# Patient Record
Sex: MALE | Race: BLACK OR AFRICAN AMERICAN | NOT HISPANIC OR LATINO | Employment: FULL TIME | URBAN - METROPOLITAN AREA
[De-identification: names, ages, dates, MRNs, and addresses within clinical notes are randomized per-mention and may not be internally consistent; named-entity substitution may affect disease eponyms.]

---

## 2017-08-02 ENCOUNTER — HOSPITAL ENCOUNTER (EMERGENCY)
Facility: HOSPITAL | Age: 32
Discharge: HOME | End: 2017-08-03
Attending: EMERGENCY MEDICINE

## 2017-08-02 DIAGNOSIS — G44.229 CHRONIC TENSION-TYPE HEADACHE, NOT INTRACTABLE: ICD-10-CM

## 2017-08-02 DIAGNOSIS — R42 DIZZINESS: ICD-10-CM

## 2017-08-02 DIAGNOSIS — Z76.0 MEDICATION REFILL: Primary | ICD-10-CM

## 2017-08-02 PROCEDURE — 93005 ELECTROCARDIOGRAM TRACING: CPT

## 2017-08-02 PROCEDURE — 99283 EMERGENCY DEPT VISIT LOW MDM: CPT

## 2017-08-03 VITALS
RESPIRATION RATE: 20 BRPM | DIASTOLIC BLOOD PRESSURE: 89 MMHG | HEIGHT: 73 IN | TEMPERATURE: 98 F | BODY MASS INDEX: 35.78 KG/M2 | SYSTOLIC BLOOD PRESSURE: 134 MMHG | HEART RATE: 54 BPM | OXYGEN SATURATION: 96 % | WEIGHT: 270 LBS

## 2017-08-03 RX ORDER — LISINOPRIL 10 MG/1
10 TABLET ORAL DAILY
Qty: 30 TABLET | Refills: 1 | Status: SHIPPED | OUTPATIENT
Start: 2017-08-03 | End: 2017-08-17 | Stop reason: SDUPTHER

## 2017-08-03 NOTE — ED PROVIDER NOTES
Encounter Date: 8/2/2017       History     Chief Complaint   Patient presents with    Dizziness     pt to triage and reports lightheaded today and occ room spinning; pt denies any injury    Headache     pt reports a headache after work today and has been getting frequent headaches for months since diagnosed with htn last year     HPI   Beth Damian is a 32 y.o. male who has a past medical history of Hypertension who presents to the Emergency Department medication refill.  Patient states he ran out today.  He has associated symptoms of headache and dizziness described as room spinning.  He states that he feels these symptoms daily and that they are relieved but was an appropriate period he does not have a blood pressure machine at home, but when he checks it at the pharmacy it is usually elevated.  Patient has had similar symptoms for the past year when he was first diagnosed with elevated blood pressure. Pt has no past surgical history on file.        Review of patient's allergies indicates:  No Known Allergies  Past Medical History:   Diagnosis Date    Hypertension      History reviewed. No pertinent surgical history.  No family history on file.  Social History   Substance Use Topics    Smoking status: Current Every Day Smoker     Packs/day: 0.50     Years: 9.00     Types: Cigarettes    Smokeless tobacco: Never Used    Alcohol use Yes     Review of Systems   Constitutional: Negative for fever.   HENT: Negative for sore throat.    Respiratory: Negative for shortness of breath.    Cardiovascular: Negative for chest pain.   Gastrointestinal: Negative for nausea.   Genitourinary: Negative for dysuria.   Musculoskeletal: Negative for back pain.   Skin: Negative for rash.   Neurological: Positive for dizziness and headaches. Negative for facial asymmetry, speech difficulty, weakness and numbness.   Hematological: Does not bruise/bleed easily.       Physical Exam     Initial Vitals [08/02/17 2154]   BP Pulse  Resp Temp SpO2   134/85 84 20 98.2 °F (36.8 °C) 96 %      MAP       101.33         Physical Exam    Nursing note and vitals reviewed.  Constitutional: He appears well-developed and well-nourished. No distress.   HENT:   Head: Normocephalic and atraumatic.   Mouth/Throat: Oropharynx is clear and moist.   Eyes: Conjunctivae are normal. Pupils are equal, round, and reactive to light.   Neck: Normal range of motion. Neck supple.   Cardiovascular: Normal rate, regular rhythm and normal heart sounds.   Pulmonary/Chest: Breath sounds normal. No respiratory distress.   Abdominal: Soft. Bowel sounds are normal. He exhibits no distension. There is no tenderness.   Musculoskeletal: Normal range of motion. He exhibits no edema or tenderness.   Lymphadenopathy:     He has no cervical adenopathy.   Neurological: He is alert and oriented to person, place, and time.   Skin: Skin is warm and dry. No rash noted. No erythema.   Psychiatric: He has a normal mood and affect. Thought content normal.         ED Course   Procedures  Labs Reviewed - No data to display          Medical Decision Making:   Initial Assessment:   This is a 32-year-old male presents for medication refill for his hypertension medication.  Patient has normal blood pressure on multiple checks.  Headache he describes as a tension headache which feels daily, sometimes worse than others.  Patient has long heavy dreadlocks and wife states that he pulls them tight.  His headache may be due to his hair.  The patient has a significant intracranial process at this time.  Recommend follow-up with PCP as an outpatient.  We will refill his lisinopril.                   ED Course     Clinical Impression:   The primary encounter diagnosis was Medication refill. Diagnoses of Dizziness and Chronic tension-type headache, not intractable were also pertinent to this visit.                           Ray Ruiz MD  08/03/17 0012

## 2017-08-17 ENCOUNTER — OFFICE VISIT (OUTPATIENT)
Dept: FAMILY MEDICINE | Facility: HOSPITAL | Age: 32
End: 2017-08-17
Attending: FAMILY MEDICINE

## 2017-08-17 VITALS
DIASTOLIC BLOOD PRESSURE: 82 MMHG | SYSTOLIC BLOOD PRESSURE: 128 MMHG | WEIGHT: 281.94 LBS | HEIGHT: 73 IN | BODY MASS INDEX: 37.37 KG/M2 | HEART RATE: 85 BPM

## 2017-08-17 DIAGNOSIS — Z76.89 ENCOUNTER TO ESTABLISH CARE: ICD-10-CM

## 2017-08-17 DIAGNOSIS — I10 ESSENTIAL HYPERTENSION: Primary | ICD-10-CM

## 2017-08-17 DIAGNOSIS — R42 DIZZINESS: ICD-10-CM

## 2017-08-17 PROCEDURE — 99213 OFFICE O/P EST LOW 20 MIN: CPT | Performed by: FAMILY MEDICINE

## 2017-08-17 RX ORDER — LISINOPRIL 10 MG/1
10 TABLET ORAL DAILY
Qty: 90 TABLET | Refills: 3 | Status: SHIPPED | OUTPATIENT
Start: 2017-08-17 | End: 2018-07-03

## 2017-08-17 NOTE — PROGRESS NOTES
Subjective:       Patient ID: Beth Damian Jr. is a 32 y.o. male.    Chief Complaint: Establish Care; Dizziness; and Headache    HPI   Mr. Damian is a 31 y/o M who presented to clinic today to establish care. He has a PMHx of HTN and a FMHx of diabetes. He has been seen in the ED twice for refills of lisinopril. He reports that for the past two weeks he has been constantly dizzy. The dizziness is worse in the morning when he can't stand without support. It is made worse by head and position changes and better by sitting. He has not had any episodes of fainting. He also complains of headaches that began shortly after his last ED visit. They occur about once per day, usually at night as he is getting ready for bed. He occasionally experiences blurry vision as well.     PSHx: None  Fx: DM  Sx: Smokes cigar x10 years, quite 3 days ago. Occasional EtOH use. 15 year hx of marijuana use, quit 1 year ago.  NKA    Review of Systems   Constitutional: Negative for activity change, diaphoresis and fatigue.   Eyes: Positive for visual disturbance (Blurry vision once every 2 days).   Respiratory: Negative for cough and shortness of breath.    Cardiovascular: Negative for chest pain.   Gastrointestinal: Negative for abdominal pain, nausea and vomiting.   Genitourinary: Negative for difficulty urinating.   Musculoskeletal: Negative for back pain and neck pain.   Neurological: Positive for dizziness. Negative for headaches.   All other systems reviewed and are negative.      Objective:      Vitals:    08/17/17 1538   BP: 128/82   Pulse: 85     Physical Exam   Constitutional: He is oriented to person, place, and time. No distress.   HENT:   Head: Normocephalic and atraumatic.   Cardiovascular: Normal rate, regular rhythm and normal heart sounds.    No murmur heard.  Pulmonary/Chest: Effort normal and breath sounds normal. No respiratory distress. He has no wheezes.   Abdominal: Soft. Bowel sounds are normal. He exhibits no  distension. There is no tenderness.   Musculoskeletal: Normal range of motion. He exhibits no edema or tenderness.   Neurological: He is alert and oriented to person, place, and time.   Skin: He is not diaphoretic.       Assessment:       1. Essential hypertension    2. Dizziness    3. Encounter to establish care        Plan:       Essential hypertension  -     lisinopril 10 MG tablet; Take 1 tablet (10 mg total) by mouth once daily.  Dispense: 90 tablet; Refill: 3    Dizziness  - Orthostatic vitals: Laying (123/80, 81), sitting (129/87, 92) and standing (124/83, 105)  - Patient reports working outdoors  - Advised patient to maintain adequate hydration    Encounter to establish care  -     CBC auto differential; Future; Expected date: 08/17/2017  -     Comprehensive metabolic panel; Future; Expected date: 08/17/2017  -     Hemoglobin A1c; Future; Expected date: 08/17/2017  -     TSH; Future; Expected date: 08/17/2017  -     Lipid panel; Future; Expected date: 08/17/2017    Return in about 1 month (around 9/17/2017), or if symptoms worsen or fail to improve.

## 2017-08-17 NOTE — PROGRESS NOTES
Subjective:       Patient ID: Beth Damian Jr. is a 32 y.o. male.    Chief Complaint: Establish Care; Dizziness; and Headache    Dizziness:    Associated symptoms: headaches.no fever, no nausea, no vomiting, no diaphoresis, no palpitations and no chest pain.  Headache     Associated symptoms include blurred vision and dizziness. Pertinent negatives include no abdominal pain, coughing, eye pain, fever, nausea, sore throat or vomiting.      Mr. Damian is a 33 y/o M who presented to clinic today to establish care. He has a PMHx of HTN and a FMHx of diabetes. He has been seen in the ED twice for refills of lisinopril. He reports that for the past two weeks he has been constantly dizzy. The dizziness is worse in the morning when he can't stand without support. It is made worse by head and position changes and better by sitting. He has not had any episodes of fainting. He also complains of headaches that began shortly after his last ED visit. They occur about once per day, usually at night as he is getting ready for bed. He occasionally experiences blurry vision as well.     Review of Systems   Constitutional: Negative for chills, diaphoresis, fatigue and fever.   HENT: Negative for congestion, ear discharge and sore throat.    Eyes: Positive for blurred vision. Negative for pain and discharge.   Respiratory: Negative for cough, hemoptysis, sputum production, shortness of breath and wheezing.    Cardiovascular: Negative for chest pain, palpitations, leg swelling and PND.   Gastrointestinal: Negative for abdominal pain, constipation, diarrhea, nausea and vomiting.   Genitourinary: Negative for dysuria.   Musculoskeletal: Negative for joint pain and myalgias.   Skin: Negative for rash.   Neurological: Positive for dizziness and headaches. Negative for loss of consciousness and syncope.   All other systems reviewed and are negative.        Review of Systems   Constitutional: Negative for chills, diaphoresis, fatigue and  fever.   HENT: Negative for congestion, ear discharge and sore throat.    Eyes: Positive for blurred vision. Negative for pain and discharge.   Respiratory: Negative for cough, hemoptysis, sputum production, shortness of breath and wheezing.    Cardiovascular: Negative for chest pain, palpitations, leg swelling and PND.   Gastrointestinal: Negative for abdominal pain, constipation, diarrhea, nausea and vomiting.   Genitourinary: Negative for dysuria.   Musculoskeletal: Negative for joint pain and myalgias.   Skin: Negative for rash.   Neurological: Positive for dizziness and headaches. Negative for loss of consciousness and syncope.   All other systems reviewed and are negative.    Objective:      Vitals:    08/17/17 1538   BP: 128/82   Pulse: 85     Physical Exam   Constitutional: He is oriented to person, place, and time. He appears well-developed and well-nourished. No distress.   HENT:   Head: Normocephalic and atraumatic.   Right Ear: Hearing, external ear and ear canal normal.   Left Ear: Hearing, tympanic membrane, external ear and ear canal normal.   Nose: Nose normal.   Mouth/Throat: Oropharynx is clear and moist.   Eyes: Conjunctivae and EOM are normal. Pupils are equal, round, and reactive to light. Right eye exhibits no discharge. Left eye exhibits no discharge. No scleral icterus.   Fundoscopic exam:       The right eye shows no papilledema.        The left eye shows no papilledema.   Neck: Normal range of motion. No thyromegaly present.   Cardiovascular: Normal rate, regular rhythm, normal heart sounds and intact distal pulses.  Exam reveals no gallop and no friction rub.    No murmur heard.  Pulmonary/Chest: Breath sounds normal. No stridor. No respiratory distress. He has no wheezes.   Abdominal: Soft. Bowel sounds are normal. He exhibits no distension. There is no tenderness.   Musculoskeletal: Normal range of motion. He exhibits no edema, tenderness or deformity.   Lymphadenopathy:     He has no  cervical adenopathy.   Neurological: He is alert and oriented to person, place, and time. No cranial nerve deficit.   Skin: Capillary refill takes less than 2 seconds. No rash noted. He is not diaphoretic.   Psychiatric: He has a normal mood and affect. His behavior is normal. Judgment and thought content normal.   Nursing note and vitals reviewed.        Assessment:       1. Essential hypertension    2. Encounter to establish care        Plan:       Essential hypertension  -     lisinopril 10 MG tablet; Take 1 tablet (10 mg total) by mouth once daily.  Dispense: 90 tablet; Refill: 3    Encounter to establish care  -     CBC auto differential; Future; Expected date: 08/17/2017  -     Comprehensive metabolic panel; Future; Expected date: 08/17/2017  -     Hemoglobin A1c; Future; Expected date: 08/17/2017  -     TSH; Future; Expected date: 08/17/2017  -     Lipid panel; Future; Expected date: 08/17/2017      No Follow-up on file.

## 2017-08-18 DIAGNOSIS — E78.2 MIXED HYPERLIPIDEMIA: Primary | ICD-10-CM

## 2017-08-18 DIAGNOSIS — R73.03 PREDIABETES: ICD-10-CM

## 2017-08-18 RX ORDER — ATORVASTATIN CALCIUM 40 MG/1
40 TABLET, FILM COATED ORAL DAILY
Qty: 90 TABLET | Refills: 3 | Status: SHIPPED | OUTPATIENT
Start: 2017-08-18 | End: 2018-07-20 | Stop reason: SDUPTHER

## 2017-08-18 RX ORDER — METFORMIN HYDROCHLORIDE 500 MG/1
500 TABLET ORAL 2 TIMES DAILY WITH MEALS
Qty: 60 TABLET | Refills: 0 | Status: SHIPPED | OUTPATIENT
Start: 2017-08-18 | End: 2017-09-19

## 2017-08-18 NOTE — PROGRESS NOTES
Called patient in order to discuss lab results. Unable to speak with patient, left a voicemail to call the clinic for more details. Will start Metformin and Lipitor for Prediabetes and HLD, respectively.    Katharine Garcia MD  [facility]  3  08/18/2017 9:42 AM

## 2017-09-19 ENCOUNTER — OFFICE VISIT (OUTPATIENT)
Dept: FAMILY MEDICINE | Facility: HOSPITAL | Age: 32
End: 2017-09-19
Attending: FAMILY MEDICINE

## 2017-09-19 VITALS
DIASTOLIC BLOOD PRESSURE: 77 MMHG | WEIGHT: 284 LBS | HEIGHT: 73 IN | SYSTOLIC BLOOD PRESSURE: 127 MMHG | HEART RATE: 83 BPM | BODY MASS INDEX: 37.64 KG/M2

## 2017-09-19 DIAGNOSIS — R73.03 PREDIABETES: ICD-10-CM

## 2017-09-19 DIAGNOSIS — I10 ESSENTIAL HYPERTENSION: Primary | ICD-10-CM

## 2017-09-19 PROCEDURE — 99213 OFFICE O/P EST LOW 20 MIN: CPT | Performed by: FAMILY MEDICINE

## 2017-09-19 RX ORDER — METFORMIN HYDROCHLORIDE 1000 MG/1
1000 TABLET ORAL 2 TIMES DAILY WITH MEALS
Qty: 180 TABLET | Refills: 3 | Status: SHIPPED | OUTPATIENT
Start: 2017-09-19 | End: 2018-07-03

## 2017-09-19 NOTE — PROGRESS NOTES
Subjective:       Patient ID: Beth Damian Jr. is a 32 y.o. male.    Chief Complaint: Follow-up    HPI   Mr. Damian is a 33 yo male recently diagnosed with HTN and prediabetes presenting for follow up visit. Patient reports feeling well and denies any complains. He reports compliance with his lisinopril and metformin medications. Patient states that he no longer has the HA and dizziness since starting lisinopril. He does not monitor his BP at home. Patient denies any adverse reactions to lisinopril and metformin. He denies any F/C, HA, SOB or chest pain.    Review of Systems   Constitutional: Negative for chills and fever.   HENT: Negative for congestion.    Respiratory: Negative for cough, shortness of breath and wheezing.    Cardiovascular: Negative for chest pain.   Gastrointestinal: Negative for abdominal pain, nausea and vomiting.   Genitourinary: Negative for difficulty urinating.   Musculoskeletal: Negative for back pain and neck pain.   Neurological: Negative for dizziness and headaches.       Objective:      Vitals:    09/19/17 1155   BP: 127/77   Pulse: 83     Physical Exam   Constitutional: He is oriented to person, place, and time. He appears well-nourished. No distress.   HENT:   Head: Normocephalic and atraumatic.   Cardiovascular: Normal rate, regular rhythm and normal heart sounds.    No murmur heard.  Pulmonary/Chest: Effort normal and breath sounds normal. No respiratory distress. He has no wheezes.   Abdominal: Soft. Bowel sounds are normal. He exhibits no distension. There is no tenderness.   Musculoskeletal: Normal range of motion. He exhibits no edema or tenderness.   Neurological: He is alert and oriented to person, place, and time.   Skin: He is not diaphoretic.       Assessment:       1. Essential hypertension    2. Prediabetes    3. BMI 37.0-37.9, adult        Plan:       Essential hypertension  - Advised patient to follow a low salt/low fat diet and monitor his home BP  - Continue  Lisinopril 10mg daily    Prediabetes  - Counseled patient on diet and exercise  -     metformin (GLUCOPHAGE) 1000 MG tablet; Take 1 tablet (1,000 mg total) by mouth 2 (two) times daily with meals.  Dispense: 180 tablet; Refill: 3    BMI 37.0-37.9, adult  - Counseled patient on diet and exercise    Return in about 2 months (around 11/19/2017), or if symptoms worsen or fail to improve.

## 2017-12-22 ENCOUNTER — HOSPITAL ENCOUNTER (EMERGENCY)
Facility: HOSPITAL | Age: 32
Discharge: HOME | End: 2017-12-22
Attending: EMERGENCY MEDICINE

## 2017-12-22 VITALS
RESPIRATION RATE: 18 BRPM | HEART RATE: 98 BPM | HEIGHT: 73 IN | WEIGHT: 275 LBS | TEMPERATURE: 101 F | DIASTOLIC BLOOD PRESSURE: 81 MMHG | SYSTOLIC BLOOD PRESSURE: 141 MMHG | OXYGEN SATURATION: 96 % | BODY MASS INDEX: 36.45 KG/M2

## 2017-12-22 DIAGNOSIS — R07.81 PLEURITIC CHEST PAIN: ICD-10-CM

## 2017-12-22 DIAGNOSIS — J40 BRONCHITIS: Primary | ICD-10-CM

## 2017-12-22 DIAGNOSIS — R07.9 CHEST PAIN: ICD-10-CM

## 2017-12-22 LAB
ALBUMIN SERPL BCP-MCNC: 3.9 G/DL
ALP SERPL-CCNC: 85 U/L
ALT SERPL W/O P-5'-P-CCNC: 28 U/L
ANION GAP SERPL CALC-SCNC: 12 MMOL/L
AST SERPL-CCNC: 25 U/L
BASOPHILS # BLD AUTO: 0.01 K/UL
BASOPHILS NFR BLD: 0.1 %
BILIRUB SERPL-MCNC: 0.5 MG/DL
BNP SERPL-MCNC: <10 PG/ML
BUN SERPL-MCNC: 16 MG/DL
CALCIUM SERPL-MCNC: 9 MG/DL
CHLORIDE SERPL-SCNC: 104 MMOL/L
CO2 SERPL-SCNC: 22 MMOL/L
CREAT SERPL-MCNC: 1.4 MG/DL
D DIMER PPP IA.FEU-MCNC: 0.24 MG/L FEU
DIFFERENTIAL METHOD: ABNORMAL
EOSINOPHIL # BLD AUTO: 0.1 K/UL
EOSINOPHIL NFR BLD: 0.8 %
ERYTHROCYTE [DISTWIDTH] IN BLOOD BY AUTOMATED COUNT: 13.8 %
EST. GFR  (AFRICAN AMERICAN): >60 ML/MIN/1.73 M^2
EST. GFR  (NON AFRICAN AMERICAN): >60 ML/MIN/1.73 M^2
FLUAV AG SPEC QL IA: NEGATIVE
FLUBV AG SPEC QL IA: NEGATIVE
GLUCOSE SERPL-MCNC: 93 MG/DL
HCT VFR BLD AUTO: 42.3 %
HGB BLD-MCNC: 13.9 G/DL
LYMPHOCYTES # BLD AUTO: 1.2 K/UL
LYMPHOCYTES NFR BLD: 14.6 %
MCH RBC QN AUTO: 28.4 PG
MCHC RBC AUTO-ENTMCNC: 32.9 G/DL
MCV RBC AUTO: 87 FL
MONOCYTES # BLD AUTO: 0.7 K/UL
MONOCYTES NFR BLD: 7.7 %
NEUTROPHILS # BLD AUTO: 6.4 K/UL
NEUTROPHILS NFR BLD: 76.6 %
PLATELET # BLD AUTO: 236 K/UL
PMV BLD AUTO: 9.3 FL
POCT GLUCOSE: 85 MG/DL (ref 70–110)
POTASSIUM SERPL-SCNC: 3.7 MMOL/L
PROT SERPL-MCNC: 7.6 G/DL
RBC # BLD AUTO: 4.89 M/UL
SODIUM SERPL-SCNC: 138 MMOL/L
SPECIMEN SOURCE: NORMAL
TROPONIN I SERPL DL<=0.01 NG/ML-MCNC: 0.01 NG/ML
TROPONIN I SERPL DL<=0.01 NG/ML-MCNC: <0.006 NG/ML
WBC # BLD AUTO: 8.4 K/UL

## 2017-12-22 PROCEDURE — 80053 COMPREHEN METABOLIC PANEL: CPT

## 2017-12-22 PROCEDURE — 99284 EMERGENCY DEPT VISIT MOD MDM: CPT

## 2017-12-22 PROCEDURE — 93005 ELECTROCARDIOGRAM TRACING: CPT

## 2017-12-22 PROCEDURE — 63600175 PHARM REV CODE 636 W HCPCS: Performed by: EMERGENCY MEDICINE

## 2017-12-22 PROCEDURE — 85379 FIBRIN DEGRADATION QUANT: CPT

## 2017-12-22 PROCEDURE — 84484 ASSAY OF TROPONIN QUANT: CPT | Mod: 91

## 2017-12-22 PROCEDURE — 82962 GLUCOSE BLOOD TEST: CPT

## 2017-12-22 PROCEDURE — 85025 COMPLETE CBC W/AUTO DIFF WBC: CPT

## 2017-12-22 PROCEDURE — 87400 INFLUENZA A/B EACH AG IA: CPT

## 2017-12-22 PROCEDURE — 87040 BLOOD CULTURE FOR BACTERIA: CPT

## 2017-12-22 PROCEDURE — 96365 THER/PROPH/DIAG IV INF INIT: CPT

## 2017-12-22 PROCEDURE — 25000003 PHARM REV CODE 250: Performed by: EMERGENCY MEDICINE

## 2017-12-22 PROCEDURE — 83880 ASSAY OF NATRIURETIC PEPTIDE: CPT

## 2017-12-22 RX ORDER — NITROGLYCERIN 0.4 MG/1
0.4 TABLET SUBLINGUAL EVERY 5 MIN PRN
Status: DISCONTINUED | OUTPATIENT
Start: 2017-12-22 | End: 2017-12-22 | Stop reason: HOSPADM

## 2017-12-22 RX ORDER — OSELTAMIVIR PHOSPHATE 75 MG/1
75 CAPSULE ORAL 2 TIMES DAILY
Qty: 10 CAPSULE | Refills: 0 | Status: SHIPPED | OUTPATIENT
Start: 2017-12-22 | End: 2017-12-27

## 2017-12-22 RX ORDER — ACETAMINOPHEN 500 MG
1000 TABLET ORAL
Status: COMPLETED | OUTPATIENT
Start: 2017-12-22 | End: 2017-12-22

## 2017-12-22 RX ORDER — ASPIRIN 325 MG
325 TABLET ORAL
Status: COMPLETED | OUTPATIENT
Start: 2017-12-22 | End: 2017-12-22

## 2017-12-22 RX ORDER — OSELTAMIVIR PHOSPHATE 75 MG/1
75 CAPSULE ORAL
Status: COMPLETED | OUTPATIENT
Start: 2017-12-22 | End: 2017-12-22

## 2017-12-22 RX ORDER — MOXIFLOXACIN HYDROCHLORIDE 400 MG/1
400 TABLET ORAL DAILY
Qty: 14 TABLET | Refills: 0 | Status: SHIPPED | OUTPATIENT
Start: 2017-12-22 | End: 2018-03-23

## 2017-12-22 RX ADMIN — MOXIFLOXACIN HYDROCHLORIDE 400 MG: 400 INJECTION, SOLUTION INTRAVENOUS at 04:12

## 2017-12-22 RX ADMIN — ACETAMINOPHEN 1000 MG: 500 TABLET ORAL at 03:12

## 2017-12-22 RX ADMIN — OSELTAMIVIR PHOSPHATE 75 MG: 75 CAPSULE ORAL at 04:12

## 2017-12-22 RX ADMIN — ASPIRIN 325 MG ORAL TABLET 325 MG: 325 PILL ORAL at 02:12

## 2017-12-22 NOTE — ED NOTES
Patient identifiers for Beth Damian Jr. checked and correct.  LOC: The patient is awake, alert and aware of environment with an appropriate affect, the patient is oriented x 3 and speaking appropriately.  APPEARANCE: Patient uncomfortable and in no acute distress, patient is clean and well groomed, patient's clothing are properly fastened.  SKIN: The skin is warm and dry, patient has normal skin turgor and moist mucus membranes.  MUSKULOSKELETAL: Patient moving all extremities well, no obvious swelling or deformities noted.  RESPIRATORY: Airway is open and patent, respirations are spontaneous, patient has a normal effort and rate.  NEUROLOGIC: PERRL, facial expression is symmetrical, bilateral hand grasp equal and even, normal sensation in all extremities when touched with a finger.

## 2017-12-22 NOTE — ED PROVIDER NOTES
"Encounter Date: 12/22/2017    SCRIBE #1 NOTE: I, Alton Willis, am scribing for, and in the presence of,  Tracie Vale MD. I have scribed the entire note.       History     Chief Complaint   Patient presents with    Headache     reports headache that started today and cough that made the pt "black out for a second". Family reports episode lasted "a minute". Pt reports blurred vision since yesterday.        Time patient was seen by the provider: 2:04 AM      The patient is a 32 y.o. male with hx of: HTN  that presents to the ED with a complaint of Headache, fever, cough. The patient reports he has been ill with the symptoms above for the past few days. He had a particularly bad fit of coughing today. The patient states as he was coughing his vision went black and he awoke a second or two afterward. He reports pain in his chest wall with cough but no shortness of breath, nausea/vomiting, or headache.          Review of patient's allergies indicates:  No Known Allergies  Past Medical History:   Diagnosis Date    Hypertension      No past surgical history on file.  Family History   Problem Relation Age of Onset    Hypertension Mother     Hypertension Father     Diabetes Brother     Diabetes Daughter     Diabetes Maternal Grandmother     Diabetes Paternal Grandfather      Social History   Substance Use Topics    Smoking status: Current Every Day Smoker     Packs/day: 0.50     Years: 9.00     Types: Cigarettes    Smokeless tobacco: Never Used    Alcohol use Yes     Review of Systems   Constitutional: Negative for fever.   HENT: Negative for sore throat.    Respiratory: Positive for cough. Negative for shortness of breath.    Cardiovascular: Positive for chest pain.   Gastrointestinal: Negative for nausea.   Genitourinary: Negative for dysuria.   Musculoskeletal: Negative for back pain.   Skin: Negative for rash.   Neurological: Positive for syncope. Negative for weakness.   Hematological: Does not bruise/bleed " easily.       Physical Exam     Initial Vitals [12/22/17 0034]   BP Pulse Resp Temp SpO2   (!) 172/87 100 20 98.5 °F (36.9 °C) 98 %      MAP       115.33         Physical Exam    Nursing note and vitals reviewed.  Constitutional: He appears well-developed and well-nourished.   HENT:   Head: Normocephalic and atraumatic.   Eyes: Conjunctivae are normal.   Neck: Neck supple.   Cardiovascular: Normal rate, regular rhythm, normal heart sounds and intact distal pulses. Exam reveals no gallop and no friction rub.    No murmur heard.  Pulmonary/Chest: Breath sounds normal. He has no wheezes. He has no rhonchi. He has no rales.   Abdominal: Soft. He exhibits no distension. There is no tenderness.   Musculoskeletal: Normal range of motion.   Neurological: He is alert and oriented to person, place, and time.   Skin: No rash noted. No erythema.   Psychiatric: He has a normal mood and affect.         ED Course   Procedures  Labs Reviewed   CBC W/ AUTO DIFFERENTIAL - Abnormal; Notable for the following:        Result Value    Hemoglobin 13.9 (*)     Gran% 76.6 (*)     Lymph% 14.6 (*)     All other components within normal limits   COMPREHENSIVE METABOLIC PANEL - Abnormal; Notable for the following:     CO2 22 (*)     All other components within normal limits   CULTURE, BLOOD   CULTURE, BLOOD   INFLUENZA A AND B ANTIGEN   TROPONIN I   TROPONIN I   B-TYPE NATRIURETIC PEPTIDE   D DIMER, QUANTITATIVE   POCT GLUCOSE     Imaging Results          X-Ray Chest PA And Lateral (Final result)  Result time 12/22/17 02:45:16    Final result by Robin Castellanos MD (12/22/17 02:45:16)                 Impression:      No acute cardiopulmonary process identified.      Electronically signed by: ROBIN CASTELLANOS MD  Date:     12/22/17  Time:    02:45              Narrative:    Chest PA and lateral.  Comparison: None.    Cardiac silhouette is normal in size.  Mediastinal structures are midline.  Lungs are symmetrically expanded.  No evidence of focal  consolidative process, pneumothorax, or significant effusion.  Bones appear intact.                              EKG Readings: (Independently Interpreted)   Initial Reading: No STEMI. Rhythm: Normal Sinus Rhythm. Heart Rate: 92.          Medical Decision Making:   Initial Assessment:   31 Y/O male presents to the ED with an complaint of syncope following a coughing fit. He will be evaluated for cardiogenic and systemic causes of his syncopal episode.   Differential Diagnosis:   Vasovagal epiosde              Attending Attestation:           Physician Attestation for Scribe:      Comments: I, Dr. Vale, personally performed the services described in this documentation. All medical record entries made by the scribe were at my direction and in my presence.  I have reviewed the chart and agree that the record reflects my personal performance and is accurate and complete.              ED Course      Clinical Impression:   The primary encounter diagnosis was Bronchitis. Diagnoses of Chest pain and Pleuritic chest pain were also pertinent to this visit.    Disposition:   Disposition: Discharged  Condition: Stable                        Aidee Vale MD  12/22/17 1921

## 2017-12-22 NOTE — ED NOTES
Eyes closed.  Awakes with verbal stimuli.  Respirations even and unlabored. Family member at bedside.

## 2017-12-22 NOTE — ED NOTES
Complaining of cough and headache since yesterday.  States he was coughing so much he blacked out for a second.

## 2017-12-27 LAB
BACTERIA BLD CULT: NORMAL
BACTERIA BLD CULT: NORMAL

## 2018-03-23 ENCOUNTER — HOSPITAL ENCOUNTER (EMERGENCY)
Facility: HOSPITAL | Age: 33
Discharge: HOME | End: 2018-03-23
Attending: EMERGENCY MEDICINE

## 2018-03-23 VITALS
BODY MASS INDEX: 34.46 KG/M2 | WEIGHT: 260 LBS | DIASTOLIC BLOOD PRESSURE: 78 MMHG | RESPIRATION RATE: 16 BRPM | OXYGEN SATURATION: 97 % | HEART RATE: 86 BPM | HEIGHT: 73 IN | TEMPERATURE: 99 F | SYSTOLIC BLOOD PRESSURE: 135 MMHG

## 2018-03-23 DIAGNOSIS — B34.9 VIRAL SYNDROME: Primary | ICD-10-CM

## 2018-03-23 LAB
DEPRECATED S PYO AG THROAT QL EIA: NEGATIVE
FLUAV AG SPEC QL IA: NEGATIVE
FLUBV AG SPEC QL IA: NEGATIVE
SPECIMEN SOURCE: NORMAL

## 2018-03-23 PROCEDURE — 99283 EMERGENCY DEPT VISIT LOW MDM: CPT

## 2018-03-23 PROCEDURE — 87400 INFLUENZA A/B EACH AG IA: CPT | Mod: 59

## 2018-03-23 PROCEDURE — 87081 CULTURE SCREEN ONLY: CPT

## 2018-03-23 PROCEDURE — 25000003 PHARM REV CODE 250: Performed by: PHYSICIAN ASSISTANT

## 2018-03-23 PROCEDURE — 87880 STREP A ASSAY W/OPTIC: CPT

## 2018-03-23 RX ORDER — IBUPROFEN 400 MG/1
800 TABLET ORAL
Status: COMPLETED | OUTPATIENT
Start: 2018-03-23 | End: 2018-03-23

## 2018-03-23 RX ADMIN — IBUPROFEN 800 MG: 400 TABLET, FILM COATED ORAL at 01:03

## 2018-03-23 NOTE — DISCHARGE INSTRUCTIONS
Increase fluids and take tylenol, motrin as needed for fever, body aches and headaches.  Follow up with primary care physician for further evaluation.

## 2018-03-23 NOTE — ED PROVIDER NOTES
Encounter Date: 3/23/2018       History     Chief Complaint   Patient presents with    Chills     Pt states he is running fever and having chills only at night x 2 days. States he started to have a sore throat yesterday. Also complains of a headache.      Beth Damian Jr., a 33 y.o. male that presents to the ED for intermittent subjective fever, sore throat and headache that started 2 days ago.  Headache is frontal in nature and waxes and wanes in intensity.  Similar to previous headaches.  Sore throat is worse before bedtime.  Denies any sick contacts.  Treatments tried include administration of Dayquil yesterday with little improvement of his symptoms.          The history is provided by the patient.     Review of patient's allergies indicates:  No Known Allergies  Past Medical History:   Diagnosis Date    Diabetes mellitus     Hypercholesteremia     Hypertension      History reviewed. No pertinent surgical history.  Family History   Problem Relation Age of Onset    Hypertension Mother     Hypertension Father     Diabetes Brother     Diabetes Daughter     Diabetes Maternal Grandmother     Diabetes Paternal Grandfather      Social History   Substance Use Topics    Smoking status: Current Every Day Smoker     Packs/day: 0.50     Years: 9.00     Types: Cigarettes    Smokeless tobacco: Never Used      Comment: cigars    Alcohol use Yes      Comment: occasional     Review of Systems   Constitutional: Positive for fever (subjective).   HENT: Positive for sore throat. Negative for congestion, trouble swallowing and voice change.    Respiratory: Negative for cough and shortness of breath.    Cardiovascular: Negative for chest pain.   Gastrointestinal: Negative for diarrhea and vomiting.   Skin: Negative for color change and rash.   Allergic/Immunologic: Negative for immunocompromised state.   Neurological: Positive for headaches. Negative for dizziness, speech difficulty, weakness, light-headedness and  numbness.   Psychiatric/Behavioral: Negative for agitation and confusion.   All other systems reviewed and are negative.      Physical Exam     Initial Vitals [03/23/18 1327]   BP Pulse Resp Temp SpO2   (!) 142/87 87 18 98.4 °F (36.9 °C) 98 %      MAP       105.33         Physical Exam    Nursing note and vitals reviewed.  Constitutional: He appears well-developed and well-nourished.   HENT:   Head: Normocephalic and atraumatic.   Right Ear: Hearing, tympanic membrane, external ear and ear canal normal.   Left Ear: Hearing, tympanic membrane, external ear and ear canal normal.   Nose: Nose normal.   Mouth/Throat: Oropharynx is clear and moist and mucous membranes are normal.   Eyes: EOM are normal.   Neck: Normal range of motion. Neck supple.   Cardiovascular: Normal rate and regular rhythm.   Pulmonary/Chest: Breath sounds normal. No stridor. No respiratory distress. He has no wheezes. He has no rhonchi. He has no rales.   Abdominal: Soft. Bowel sounds are normal. He exhibits no distension. There is no tenderness. There is no rebound and no guarding.   Musculoskeletal: Normal range of motion. He exhibits no edema or tenderness.   Lymphadenopathy:     He has no cervical adenopathy.   Neurological: He is alert and oriented to person, place, and time. No cranial nerve deficit.   Skin: Skin is warm and dry. No rash and no abscess noted. No erythema.   Psychiatric: He has a normal mood and affect. Thought content normal.         ED Course   Procedures  Labs Reviewed   THROAT SCREEN, RAPID   CULTURE, STREP A,  THROAT   INFLUENZA A AND B ANTIGEN             Medical Decision Making:   Initial Assessment:   Headache, subjective fever, sore throat  Differential Diagnosis:   Influenza, Strep, viral syndrome  ED Management:  Patient presents to ED in NAD, afebrile and non-toxic.  Lungs are CTAB.  No oopharyngeal erythema or edema.  Motrin given with improvement of headache which I suspect is benign.  Patient neurologically  "intact.   No neck stiffness, vision changes, fever, rash, meningismus/neck stiffness to suggest pseudotumor cerebri or meningitis.  No pain over temporal arteries or vision changes/loss to suggest temporal arteritis.  No "thunderclap onset" or neck stiffness to suggest spontaneous SAH/ICH.  No lancinated pain to eyes with tearing to suggest cluster headache.  No sinus pressure or nasal congestion to suggest sinus headache.  Patient's headache is not in a "band like" distrubution to suggest tension headache.  Influenza and strep negative.  Patient likely suffering from viral syndrome.  He was instructed to f/u with PCP for further evaluation.                Attending Attestation:     Physician Attestation Statement for NP/PA:   I discussed this assessment and plan of this patient with the NP/PA, but I did not personally examine the patient. The face to face encounter was performed by the NP/PA.                     Clinical Impression:   The encounter diagnosis was Viral syndrome.                           Hannah Bernabe PA-C  03/23/18 1507       Daniel Murry MD  03/23/18 9968    "

## 2018-03-23 NOTE — ED NOTES
Pt c/o fever and chills (only at night) x 2 nights. Sorethroat and non productive cough at night also reports ha and chest pain. Medical hx htn and dm.

## 2018-03-25 LAB — BACTERIA THROAT CULT: NORMAL

## 2018-07-03 ENCOUNTER — HOSPITAL ENCOUNTER (EMERGENCY)
Facility: HOSPITAL | Age: 33
Discharge: HOME | End: 2018-07-03
Attending: EMERGENCY MEDICINE
Payer: MEDICAID

## 2018-07-03 VITALS
TEMPERATURE: 99 F | HEIGHT: 73 IN | WEIGHT: 275 LBS | HEART RATE: 82 BPM | RESPIRATION RATE: 16 BRPM | DIASTOLIC BLOOD PRESSURE: 92 MMHG | SYSTOLIC BLOOD PRESSURE: 146 MMHG | OXYGEN SATURATION: 96 % | BODY MASS INDEX: 36.45 KG/M2

## 2018-07-03 DIAGNOSIS — I10 HYPERTENSION, UNSPECIFIED TYPE: ICD-10-CM

## 2018-07-03 DIAGNOSIS — I10 ESSENTIAL HYPERTENSION: ICD-10-CM

## 2018-07-03 DIAGNOSIS — R42 DIZZINESS: Primary | ICD-10-CM

## 2018-07-03 LAB
ANION GAP SERPL CALC-SCNC: 10 MMOL/L
BASOPHILS # BLD AUTO: 0.01 K/UL
BASOPHILS NFR BLD: 0.1 %
BILIRUB UR QL STRIP: NEGATIVE
BUN SERPL-MCNC: 11 MG/DL
CALCIUM SERPL-MCNC: 9.9 MG/DL
CHLORIDE SERPL-SCNC: 105 MMOL/L
CLARITY UR: CLEAR
CO2 SERPL-SCNC: 24 MMOL/L
COLOR UR: YELLOW
CREAT SERPL-MCNC: 1.2 MG/DL
DIFFERENTIAL METHOD: ABNORMAL
EOSINOPHIL # BLD AUTO: 0 K/UL
EOSINOPHIL NFR BLD: 0.4 %
ERYTHROCYTE [DISTWIDTH] IN BLOOD BY AUTOMATED COUNT: 14.3 %
EST. GFR  (AFRICAN AMERICAN): >60 ML/MIN/1.73 M^2
EST. GFR  (NON AFRICAN AMERICAN): >60 ML/MIN/1.73 M^2
GLUCOSE SERPL-MCNC: 74 MG/DL (ref 70–110)
GLUCOSE SERPL-MCNC: 92 MG/DL
GLUCOSE UR QL STRIP: NEGATIVE
HCT VFR BLD AUTO: 43.9 %
HGB BLD-MCNC: 14.5 G/DL
HGB UR QL STRIP: NEGATIVE
KETONES UR QL STRIP: NEGATIVE
LEUKOCYTE ESTERASE UR QL STRIP: NEGATIVE
LYMPHOCYTES # BLD AUTO: 2.9 K/UL
LYMPHOCYTES NFR BLD: 30.3 %
MCH RBC QN AUTO: 28 PG
MCHC RBC AUTO-ENTMCNC: 33 G/DL
MCV RBC AUTO: 85 FL
MONOCYTES # BLD AUTO: 0.3 K/UL
MONOCYTES NFR BLD: 3.5 %
NEUTROPHILS # BLD AUTO: 6.2 K/UL
NEUTROPHILS NFR BLD: 65.5 %
NITRITE UR QL STRIP: NEGATIVE
PH UR STRIP: 6 [PH] (ref 5–8)
PLATELET # BLD AUTO: 259 K/UL
PMV BLD AUTO: 9.5 FL
POCT GLUCOSE: 105 MG/DL (ref 70–110)
POTASSIUM SERPL-SCNC: 4.1 MMOL/L
PROT UR QL STRIP: NEGATIVE
RBC # BLD AUTO: 5.18 M/UL
SODIUM SERPL-SCNC: 139 MMOL/L
SP GR UR STRIP: 1.02 (ref 1–1.03)
URN SPEC COLLECT METH UR: NORMAL
UROBILINOGEN UR STRIP-ACNC: 1 EU/DL
WBC # BLD AUTO: 9.51 K/UL

## 2018-07-03 PROCEDURE — 80048 BASIC METABOLIC PNL TOTAL CA: CPT

## 2018-07-03 PROCEDURE — 93010 ELECTROCARDIOGRAM REPORT: CPT | Mod: ,,, | Performed by: INTERNAL MEDICINE

## 2018-07-03 PROCEDURE — 82962 GLUCOSE BLOOD TEST: CPT

## 2018-07-03 PROCEDURE — 25000003 PHARM REV CODE 250: Performed by: EMERGENCY MEDICINE

## 2018-07-03 PROCEDURE — 81003 URINALYSIS AUTO W/O SCOPE: CPT

## 2018-07-03 PROCEDURE — 85025 COMPLETE CBC W/AUTO DIFF WBC: CPT

## 2018-07-03 PROCEDURE — 93005 ELECTROCARDIOGRAM TRACING: CPT

## 2018-07-03 PROCEDURE — 93010 EKG 12-LEAD: ICD-10-PCS | Mod: ,,, | Performed by: INTERNAL MEDICINE

## 2018-07-03 PROCEDURE — 99284 EMERGENCY DEPT VISIT MOD MDM: CPT | Mod: 25

## 2018-07-03 RX ORDER — SODIUM CHLORIDE 9 MG/ML
1000 INJECTION, SOLUTION INTRAVENOUS
Status: DISCONTINUED | OUTPATIENT
Start: 2018-07-03 | End: 2018-07-03 | Stop reason: HOSPADM

## 2018-07-03 RX ORDER — METFORMIN HYDROCHLORIDE 500 MG/1
500 TABLET ORAL
Qty: 30 TABLET | Refills: 0 | Status: SHIPPED | OUTPATIENT
Start: 2018-07-03 | End: 2019-07-03

## 2018-07-03 RX ORDER — LISINOPRIL 10 MG/1
10 TABLET ORAL 2 TIMES DAILY
Qty: 60 TABLET | Refills: 3 | Status: SHIPPED | OUTPATIENT
Start: 2018-07-03 | End: 2018-07-19 | Stop reason: ALTCHOICE

## 2018-07-03 RX ORDER — LISINOPRIL 10 MG/1
10 TABLET ORAL 2 TIMES DAILY
Qty: 60 TABLET | Refills: 3 | Status: SHIPPED | OUTPATIENT
Start: 2018-07-03 | End: 2018-07-03

## 2018-07-03 RX ORDER — METFORMIN HYDROCHLORIDE 500 MG/1
500 TABLET ORAL
Qty: 60 TABLET | Refills: 0 | Status: SHIPPED | OUTPATIENT
Start: 2018-07-03 | End: 2021-01-21 | Stop reason: SDUPTHER

## 2018-07-03 NOTE — ED PROVIDER NOTES
Encounter Date: 7/3/2018    SCRIBE #1 NOTE: I, Beverly Charanjit, am scribing for, and in the presence of,  Dr. Crowder. I have scribed the entire note.       History     Chief Complaint   Patient presents with    Dizziness     dizziness that started three days ago. Denies chest pain, and/or shortness of breath.  Has not been taking Metformin because it cause him to have loose stools.     Time seen by provider: 5:55 PM    This is a 33 y.o. male with PMHx of HTN, DM, hypercholesteremia who presents to the ED with a cc of lightheadedness x 3 days. The lightheadedness is exacerbated by standing up, eating chicken and salty food. Pt reports no alleviating factors. Pt denies fever, chest pain, urinary complaints, diarrhea or SOB. Pt reports a prior history of similar symptoms when he had previously been on Lisinopril 10mg once a day; he had been taking it BID for awhile without symptoms, but had a recent medication change back to once a day. Pt also stopped taking his Metformin because it caused diarrhea. He is a smoker. PCP is Dr. Garcia.   Pt denies any hx of young CAD, or CVA's        The history is provided by the patient.     Review of patient's allergies indicates:  No Known Allergies  Past Medical History:   Diagnosis Date    Diabetes mellitus     Hypercholesteremia     Hypertension      History reviewed. No pertinent surgical history.  Family History   Problem Relation Age of Onset    Hypertension Mother     Hypertension Father     Diabetes Brother     Diabetes Daughter     Diabetes Maternal Grandmother     Diabetes Paternal Grandfather      Social History   Substance Use Topics    Smoking status: Current Every Day Smoker     Packs/day: 0.50     Years: 9.00     Types: Cigarettes    Smokeless tobacco: Never Used      Comment: cigars    Alcohol use Yes      Comment: occasional     Review of Systems   Constitutional: Negative.  Negative for fever.   HENT: Negative.  Negative for sore throat.    Eyes:  Negative.    Respiratory: Negative.  Negative for cough and shortness of breath.    Cardiovascular: Negative.  Negative for chest pain.   Gastrointestinal: Negative.  Negative for abdominal pain, diarrhea, nausea and vomiting.   Endocrine: Negative.    Genitourinary: Negative.  Negative for dysuria, flank pain, frequency, hematuria and urgency.        Negative for urinary complaints.   Musculoskeletal: Negative.  Negative for back pain.   Skin: Negative.  Negative for rash.   Allergic/Immunologic: Negative.    Neurological: Positive for light-headedness. Negative for weakness.   Hematological: Does not bruise/bleed easily.   Psychiatric/Behavioral: Negative.  Negative for confusion and hallucinations.   All other systems reviewed and are negative.      Physical Exam     Initial Vitals [07/03/18 1723]   BP Pulse Resp Temp SpO2   (!) 153/79 82 16 98.6 °F (37 °C) 98 %      MAP       --         Physical Exam    Nursing note and vitals reviewed.  Constitutional: He appears well-developed and well-nourished.   Well-appearing, MMM   HENT:   Head: Normocephalic and atraumatic.   Eyes: Conjunctivae and EOM are normal. Pupils are equal, round, and reactive to light.   Neck: Normal range of motion. Neck supple.   Cardiovascular: Normal rate, regular rhythm, normal heart sounds and intact distal pulses.   Pulmonary/Chest: Breath sounds normal.   Abdominal: Soft. Bowel sounds are normal.   Musculoskeletal: Normal range of motion.   Neurological: He is alert and oriented to person, place, and time. He has normal strength and normal reflexes.   Normal finger-nose, normal gait   Skin: Skin is warm. Capillary refill takes less than 2 seconds.   Psychiatric: He has a normal mood and affect. His behavior is normal. Judgment and thought content normal.         ED Course   Procedures  Labs Reviewed   POCT GLUCOSE   POCT GLUCOSE MONITORING CONTINUOUS     EKG Readings: (Independently Interpreted)   Rhythm: Normal Sinus Rhythm. Heart Rate:  74. Ectopy: No Ectopy. Conduction: Normal. ST Segments: Normal ST Segments. T Waves: Normal. Axis: Normal. Clinical Impression: Normal Sinus Rhythm       Imaging Results    None       X-Rays:   Independently Interpreted Readings:   Other Readings:  Narrative     EXAMINATION:  CT HEAD WITHOUT CONTRAST    CLINICAL HISTORY:  Dizziness;    TECHNIQUE:  Low dose axial CT images obtained throughout the head without intravenous contrast. Sagittal and coronal reconstructions were performed.    COMPARISON:  None.    FINDINGS:  Intracranial compartment:    Ventricles and sulci are normal in size for age without evidence of hydrocephalus. No extra-axial blood or fluid collections.    The brain parenchyma appears normal. No parenchymal mass, hemorrhage, edema or major vascular distribution infarct.    Skull/extracranial contents (limited evaluation): No fracture. Mastoid air cells and paranasal sinuses are essentially clear.  Visualized portions of the orbits are within normal limits.  Impression       Normal noncontrast head CT.        Medical Decision Making:   Initial Assessment:   This is a 33 y.o. male with PMHx of HTN, DM, hypercholesteremia who presents to the ED with a cc of lightheadedness x 3 days. The lightheadedness is exacerbated by standing up, eating chicken and salty food. Pt reports no alleviating factors. Pt denies fever, chest pain, urinary complaints, diarrhea or SOB. Pt reports a prior history of similar symptoms when he had previously been on Lisinopril 10mg once a day; he had been taking it BID for awhile without symptoms, but had a recent medication change back to once a day. Pt also stopped taking his Metformin because it caused diarrhea. He is a smoker. PCP is Dr. Garcia.       Differential Diagnosis:   Orthostatic hypotension  CVA  BPV  Dehydration  Clinical Tests:   Lab Tests: Ordered and Reviewed  ED Management:  Pt placed on cardiac monitor arrival, IV line was placed, and labs were drawn.    2000   Pt reassessed, denies dizziness, OVS NL, remains neurovascular intact.  Pt encouraged to take blood pressure medicine b.i.d. instead of once a day, since he states he does not have symptoms when he takes blood pressure medicine b.i.d..  Patient also encouraged to follow up with PCP in 2 days for further evaluation in addition to starting back on his metformin 5 mg b.i.d. Since 1000 mg BID causes GI upset.                      Clinical Impression:     1. Dizziness    2. Hypertension, unspecified type            Disposition:   Disposition: Discharged  Condition: Stable                        Familia Crowder MD  07/03/18 2010

## 2018-07-03 NOTE — ED TRIAGE NOTES
Pt reports episodes of lightheadedness after eating fried foods over the past few days.  Denies abd pain, denies NVD. Denies chest pain or SoB. Non-compliant with Metformin d/t GI side effects.   
No

## 2018-07-04 NOTE — ED NOTES
Orthostatic completed. Results were negative. Dr. Crowder to bedside to update patient. Pt and wife verbalized understanding

## 2018-07-04 NOTE — ED NOTES
Pt is alert and oriented x4. Denies any pain. Blood drawn and sent. Urine collected. Wife is at bedside.

## 2018-07-12 DIAGNOSIS — R42 DIZZINESS: Primary | ICD-10-CM

## 2018-07-19 ENCOUNTER — OFFICE VISIT (OUTPATIENT)
Dept: FAMILY MEDICINE | Facility: HOSPITAL | Age: 33
End: 2018-07-19
Attending: FAMILY MEDICINE
Payer: MEDICAID

## 2018-07-19 VITALS
HEART RATE: 93 BPM | SYSTOLIC BLOOD PRESSURE: 126 MMHG | DIASTOLIC BLOOD PRESSURE: 85 MMHG | BODY MASS INDEX: 36.93 KG/M2 | HEIGHT: 73 IN | WEIGHT: 278.69 LBS

## 2018-07-19 DIAGNOSIS — E78.2 MIXED HYPERLIPIDEMIA: ICD-10-CM

## 2018-07-19 DIAGNOSIS — R73.03 PREDIABETES: Primary | ICD-10-CM

## 2018-07-19 DIAGNOSIS — I10 ESSENTIAL HYPERTENSION: ICD-10-CM

## 2018-07-19 PROCEDURE — 99215 OFFICE O/P EST HI 40 MIN: CPT

## 2018-07-19 RX ORDER — ACETAMINOPHEN 500 MG
1 TABLET ORAL DAILY
Qty: 1 EACH | Refills: 0 | Status: ON HOLD | OUTPATIENT
Start: 2018-07-19 | End: 2021-05-20 | Stop reason: HOSPADM

## 2018-07-19 RX ORDER — CHLORTHALIDONE 25 MG/1
25 TABLET ORAL DAILY
Qty: 30 TABLET | Refills: 2 | Status: SHIPPED | OUTPATIENT
Start: 2018-07-19 | End: 2021-01-05 | Stop reason: SDUPTHER

## 2018-07-20 DIAGNOSIS — E78.2 MIXED HYPERLIPIDEMIA: ICD-10-CM

## 2018-07-20 NOTE — TELEPHONE ENCOUNTER
----- Message from Randi Hair sent at 7/20/2018 10:07 AM CDT -----  Patient needs a refill for atorvastatin (LIPITOR) 40 MG tablet

## 2018-07-20 NOTE — PROGRESS NOTES
Subjective:       Patient ID: Beth Damian Jr. is a 33 y.o. male.    Chief Complaint: Diabetes    HPI   32 y/o AA obese male with a PMH pre-diabetes, HTN, HLD here for f/u on diabetes.  Stopped taking lisinopril 1 week ago because it caused him dizziness.  Works outside washing cars. Denies syncope.  Denies vertigo.  Reports his bp is normal without lisinopril.     Review of Systems  denies polyuria, polydipsia, n/v/d  Objective:      Vitals:    07/19/18 1612   BP: 126/85   Pulse: 93     Physical Exam   Constitutional: He is oriented to person, place, and time. He appears well-developed and well-nourished.   HENT:   Head: Normocephalic and atraumatic.   Eyes: EOM are normal.   Neck: Normal range of motion. Neck supple.   Cardiovascular: Normal rate and regular rhythm.    Pulmonary/Chest: Effort normal and breath sounds normal.   Abdominal: Soft. Bowel sounds are normal.   Musculoskeletal: Normal range of motion.   Neurological: He is alert and oriented to person, place, and time.   Skin: Skin is warm.   Psychiatric: He has a normal mood and affect.       Assessment:       1. Prediabetes    2. BMI 37.0-37.9, adult    3. Essential hypertension    4. Mixed hyperlipidemia        Plan:       Prediabetes  -     Ambulatory consult to Diabetic Education  -     TSH; Future; Expected date: 07/19/2018  -     Hemoglobin A1c; Future; Expected date: 07/19/2018  -     Ambulatory Referral to Ophthalmology    BMI 37.0-37.9, adult  -     Ambulatory referral to Nutrition Services    Essential hypertension  -     TSH; Future; Expected date: 07/19/2018  -     chlorthalidone (HYGROTEN) 25 MG Tab; Take 1 tablet (25 mg total) by mouth once daily.  Dispense: 30 tablet; Refill: 2  -     blood pressure monitor (BLOOD PRESSURE KIT) Kit; 1 each by Misc.(Non-Drug; Combo Route) route once daily.  Dispense: 1 each; Refill: 0    Mixed hyperlipidemia  -     TSH; Future; Expected date: 07/19/2018  -     Lipid panel; Future; Expected date:  07/19/2018      F/u 6 months

## 2018-07-20 NOTE — PROGRESS NOTES
I assume primary medical responsibility for this patient, I have reviewed the case history, findings, diagnosis and treatment plan with the resident and agree that the care is reasonable and necessary. This service has been performed by a resident without the presence of a teaching physician under the primary care exception  Raina Gaona  7/20/2018

## 2018-07-24 RX ORDER — ATORVASTATIN CALCIUM 40 MG/1
40 TABLET, FILM COATED ORAL DAILY
Qty: 90 TABLET | Refills: 3 | Status: SHIPPED | OUTPATIENT
Start: 2018-07-24 | End: 2021-01-21 | Stop reason: SDUPTHER

## 2018-07-25 ENCOUNTER — TELEPHONE (OUTPATIENT)
Dept: FAMILY MEDICINE | Facility: HOSPITAL | Age: 33
End: 2018-07-25

## 2018-07-25 NOTE — TELEPHONE ENCOUNTER
----- Message from Ping Pozo MA sent at 7/24/2018  2:38 PM CDT -----  Patient states he is feeling weak and diaay.  Would like a call to - to discuss.  Thanks.

## 2019-08-09 ENCOUNTER — HOSPITAL ENCOUNTER (EMERGENCY)
Facility: HOSPITAL | Age: 34
Discharge: HOME | End: 2019-08-10
Attending: EMERGENCY MEDICINE
Payer: MEDICAID

## 2019-08-09 VITALS
BODY MASS INDEX: 38.43 KG/M2 | RESPIRATION RATE: 19 BRPM | HEIGHT: 73 IN | OXYGEN SATURATION: 97 % | WEIGHT: 290 LBS | TEMPERATURE: 99 F | HEART RATE: 100 BPM | DIASTOLIC BLOOD PRESSURE: 73 MMHG | SYSTOLIC BLOOD PRESSURE: 131 MMHG

## 2019-08-09 DIAGNOSIS — J02.9 VIRAL PHARYNGITIS: Primary | ICD-10-CM

## 2019-08-09 LAB — DEPRECATED S PYO AG THROAT QL EIA: NEGATIVE

## 2019-08-09 PROCEDURE — 99283 EMERGENCY DEPT VISIT LOW MDM: CPT

## 2019-08-09 PROCEDURE — 87880 STREP A ASSAY W/OPTIC: CPT

## 2019-08-09 PROCEDURE — 87081 CULTURE SCREEN ONLY: CPT

## 2019-08-10 PROCEDURE — 63600175 PHARM REV CODE 636 W HCPCS: Performed by: EMERGENCY MEDICINE

## 2019-08-10 RX ORDER — DEXAMETHASONE SODIUM PHOSPHATE 100 MG/10ML
10 INJECTION INTRAMUSCULAR; INTRAVENOUS
Status: COMPLETED | OUTPATIENT
Start: 2019-08-10 | End: 2019-08-10

## 2019-08-10 RX ADMIN — DEXAMETHASONE SODIUM PHOSPHATE 10 MG: 10 INJECTION, SOLUTION INTRAMUSCULAR; INTRAVENOUS at 12:08

## 2019-08-10 NOTE — ED PROVIDER NOTES
Encounter Date: 8/9/2019    SCRIBE #1 NOTE: I, Maris German, am scribing for, and in the presence of,  Dr. Beltre. I have scribed the entire note.       History     Chief Complaint   Patient presents with    Sore Throat     34y M ambulatory to ED with c/o sore throat x3 days.      Beth Damian Jr. is a 34 y.o. male who  has a past medical history of Diabetes mellitus, Hypercholesteremia, and Hypertension.    The patient presents to the ED due to sore throat x3 days. He states a family member has been sick at home with similar symptoms. He denies any fever, N/V, difficulty breathing/swallowing, neck pain/swelling, congestion, or CP/SOB.  He has been taking Advil without improvement.  He denies any other complaints at this time.        Review of patient's allergies indicates:  No Known Allergies  Past Medical History:   Diagnosis Date    Diabetes mellitus     Hypercholesteremia     Hypertension      No past surgical history on file.  Family History   Problem Relation Age of Onset    Hypertension Mother     Hypertension Father     Diabetes Brother     Diabetes Daughter     Diabetes Maternal Grandmother     Diabetes Paternal Grandfather      Social History     Tobacco Use    Smoking status: Current Every Day Smoker     Packs/day: 0.50     Years: 9.00     Pack years: 4.50     Types: Cigarettes    Smokeless tobacco: Never Used    Tobacco comment: cigars   Substance Use Topics    Alcohol use: Yes     Comment: occasional    Drug use: No     Review of Systems   Constitutional: Negative for chills and fever.   HENT: Positive for sore throat. Negative for congestion, facial swelling, postnasal drip, rhinorrhea, sinus pressure, sinus pain and trouble swallowing.    Respiratory: Negative for shortness of breath.    Cardiovascular: Negative for chest pain.   Gastrointestinal: Negative for abdominal pain, constipation, diarrhea, nausea and vomiting.   Genitourinary: Negative for dysuria, frequency and urgency.    Musculoskeletal: Negative for back pain.   Skin: Negative for rash and wound.   Neurological: Negative for weakness.   Hematological: Does not bruise/bleed easily.   Psychiatric/Behavioral: Negative for agitation, behavioral problems and confusion.       Physical Exam     Initial Vitals [08/09/19 2214]   BP Pulse Resp Temp SpO2   131/73 100 19 98.8 °F (37.1 °C) 97 %      MAP       --         Physical Exam    Nursing note and vitals reviewed.  Constitutional: He appears well-developed and well-nourished. He is not diaphoretic. No distress.   HENT:   Head: Normocephalic and atraumatic.   Mouth/Throat: Uvula is midline. No oral lesions. No trismus in the jaw. No uvula swelling. Oropharyngeal exudate present. No posterior oropharyngeal edema, posterior oropharyngeal erythema or tonsillar abscesses.   R tonsil mildly enlarged with patchy white exudates.   Eyes: EOM are normal. Pupils are equal, round, and reactive to light.   Neck: No tracheal deviation present.   Cardiovascular: Normal rate, regular rhythm, normal heart sounds and intact distal pulses.   Pulmonary/Chest: Breath sounds normal. No stridor. No respiratory distress.   Abdominal: Soft. He exhibits no distension and no mass. There is no tenderness.   Musculoskeletal: Normal range of motion. He exhibits no edema.   Neurological: He is alert and oriented to person, place, and time. No cranial nerve deficit or sensory deficit.   Skin: Skin is warm and dry. Capillary refill takes less than 2 seconds. No rash noted.   Psychiatric: He has a normal mood and affect. His behavior is normal. Thought content normal.         ED Course   Procedures  Labs Reviewed   THROAT SCREEN, RAPID   CULTURE, STREP A,  THROAT          Imaging Results    None          Medical Decision Making:   Differential Diagnosis:   Differential Diagnosis includes, but is not limited to:  Ron's angina, epiglottitis, foreign body aspiration, retropharyngeal abscess, peritonsillar abscess,  esophageal perforation, mediastinitis, esophagitis, sialolithiasis, parotitis, laryngitis, tracheitis, dental/periapical abscess, TMJ disorder, pneumonia, Streptococcal/bacterial pharyngitis, viral pharyngitis.    Clinical Tests:   Lab Tests: Reviewed  ED Management:  Rapid strep negative.  Patient afebrile, vitals unremarkable. Patient well-appearing. Will hold ABX at this time pending strep culture, and give Decadron p.o.  Patient counseled on symptomatic and supportive care, including follow-up with PCP if symptoms do not improve.  Return to ER for worsening symptoms or other concerns.    After complete evaluation, including thorough history and physical exam, the patient's symptoms are most likely due to viral pharyngitis. There are no concerning features on physical exam to suggest bacterial otitis media/externa, sinusitis, pharyngitis, or peritonsillar abscess. Vital signs do not suggest sepsis. Lung sounds are clear and not consistent with pneumonia. There is no neck pain or limited ROM to suggest retropharyngeal abscess or meningitis. The patient will be treated with supportive care.  Upon re-evaluation, the patient's status has improved.  After complete ED evaluation, clinical impression is most consistent with viral pharyngitis.  PCP follow-up within 2-3 days was recommended.    After taking into careful account the patient's history, physical exam findings, as well as empirical and objective data obtained throughout ED workup, I feel no emergent medical condition has been identified. No further evaluation or admission was felt to be required, and the patient is stable for discharge from the ED. The patient and any additional family present were updated with test results, overall clinical impression, and recommended further plan of care, including discharge instructions as provided and outpatient follow-up for continued evaluation and management as needed. All questions were answered. The patient expressed  understanding and agreed with current plan for discharge and follow-up plan of care. Strict ED return precautions were provided, including return/worsening of current symptoms, new symptoms, or any other concerns.                        Clinical Impression:       ICD-10-CM ICD-9-CM   1. Viral pharyngitis J02.9 462         Disposition:   Disposition: Discharged  Condition: Stable        I, Dr. Joaquín Beltre, personally performed the services described in this documentation. All medical record entries made by the scribe were at my direction and in my presence.  I have reviewed the chart and agree that the record reflects my personal performance and is accurate and complete.     Joaquín Beltre MD.  12:53 AM 08/10/2019                 Joaquín Beltre MD  08/11/19 0297

## 2019-08-10 NOTE — ED TRIAGE NOTES
Patient presents to the ED with reports of having a sore throat x 3 days with symptoms that include general malaise and body aches. Denies any nausea, vomiting, diarrhea, or dysuria. States having treated symptoms with OTC NSAIDS with no relief.

## 2019-08-12 LAB — BACTERIA THROAT CULT: NORMAL

## 2019-12-17 ENCOUNTER — HOSPITAL ENCOUNTER (EMERGENCY)
Facility: HOSPITAL | Age: 34
Discharge: HOME | End: 2019-12-17
Attending: EMERGENCY MEDICINE
Payer: MEDICAID

## 2019-12-17 VITALS
DIASTOLIC BLOOD PRESSURE: 102 MMHG | WEIGHT: 260 LBS | HEIGHT: 73 IN | SYSTOLIC BLOOD PRESSURE: 146 MMHG | TEMPERATURE: 98 F | BODY MASS INDEX: 34.46 KG/M2 | HEART RATE: 92 BPM | OXYGEN SATURATION: 93 % | RESPIRATION RATE: 18 BRPM

## 2019-12-17 DIAGNOSIS — S61.217A LACERATION OF LEFT LITTLE FINGER WITHOUT FOREIGN BODY WITHOUT DAMAGE TO NAIL, INITIAL ENCOUNTER: Primary | ICD-10-CM

## 2019-12-17 PROCEDURE — 25000003 PHARM REV CODE 250: Performed by: PHYSICIAN ASSISTANT

## 2019-12-17 PROCEDURE — 99283 EMERGENCY DEPT VISIT LOW MDM: CPT | Mod: 25

## 2019-12-17 PROCEDURE — 12001 RPR S/N/AX/GEN/TRNK 2.5CM/<: CPT

## 2019-12-17 RX ORDER — KETOROLAC TROMETHAMINE 10 MG/1
10 TABLET, FILM COATED ORAL
Status: COMPLETED | OUTPATIENT
Start: 2019-12-17 | End: 2019-12-17

## 2019-12-17 RX ADMIN — KETOROLAC TROMETHAMINE 10 MG: 10 TABLET, FILM COATED ORAL at 12:12

## 2019-12-17 NOTE — ED PROVIDER NOTES
Encounter Date: 12/17/2019    SCRIBE #1 NOTE: I, Yaimle Rincon, am scribing for, and in the presence of,  JENNA Reyes. I have scribed the entire note.       History     Chief Complaint   Patient presents with    Finger Injury     Reports was cutting smoked sausage around 0200 when he sliced his L 5th digit.      Beth Damian Jr. is a 34 y.o. male who  has a past medical history of Diabetes mellitus, Hypercholesteremia, and Hypertension.    The patient presents to the ED with laceration to L 5th digit that happened at 2:00 when he was cutting smoked sausage. He cleaned and bandaged the wound before going to bed. When he woke up this morning, he noticed more pain to this finger and presents to the ED for evaluation. The patient does not remember when his last tetanus vaccine was. He reports that he is prediabetic and currently taking metformin, and he is also a current smoker. He denies any other injuries or any loss of sensation to the affected digit.     The history is provided by the patient.     Review of patient's allergies indicates:  No Known Allergies  Past Medical History:   Diagnosis Date    Diabetes mellitus     Hypercholesteremia     Hypertension      No past surgical history on file.  Family History   Problem Relation Age of Onset    Hypertension Mother     Hypertension Father     Diabetes Brother     Diabetes Daughter     Diabetes Maternal Grandmother     Diabetes Paternal Grandfather      Social History     Tobacco Use    Smoking status: Current Every Day Smoker     Packs/day: 0.50     Years: 9.00     Pack years: 4.50     Types: Cigarettes    Smokeless tobacco: Never Used    Tobacco comment: cigars   Substance Use Topics    Alcohol use: Yes     Comment: occasional    Drug use: No     Review of Systems   Constitutional: Negative for chills and fever.   Eyes: Negative for redness.   Cardiovascular: Negative for chest pain.   Gastrointestinal: Negative for diarrhea, nausea and  vomiting.   Musculoskeletal: Positive for arthralgias. Negative for back pain, joint swelling and myalgias.   Skin: Positive for wound (laceration to 5th digist of L hand). Negative for rash.   Allergic/Immunologic: Negative for immunocompromised state.   Neurological: Negative for numbness and headaches.       Physical Exam     Initial Vitals [12/17/19 1140]   BP Pulse Resp Temp SpO2   (!) 146/102 92 18 97.8 °F (36.6 °C) (!) 93 %      MAP       --         Physical Exam    Constitutional: He appears well-developed and well-nourished. No distress.   HENT:   Head: Normocephalic and atraumatic.   Eyes: Conjunctivae are normal.   Neck: Normal range of motion.   Cardiovascular: Normal rate.   Pulmonary/Chest: No respiratory distress.   Abdominal: He exhibits no distension.   Musculoskeletal: Normal range of motion. He exhibits no edema.   Neurological: He is alert and oriented to person, place, and time. He has normal strength. No sensory deficit.   Skin: Skin is warm and dry. Capillary refill takes less than 2 seconds. Laceration noted.   5th digit of L hand:  L shaped flap laceration along volar aspect; no bleeding; edges are well approximated; new granulation noted; no nail or tendon involvement.  Flap along distal aspect appears avascular however good vascular flow noted to proximal aspect   Psychiatric: He has a normal mood and affect.         ED Course   Lac Repair  Date/Time: 12/17/2019 12:52 PM  Performed by: JENNA Reyes  Authorized by: Alex Lane MD   Consent Done: Yes  Consent: Verbal consent obtained.  Risks and benefits: risks, benefits and alternatives were discussed  Consent given by: patient  Patient understanding: patient states understanding of the procedure being performed  Body area: upper extremity  Location details: left ring finger  Laceration length: 1 cm  Foreign bodies: no foreign bodies  Tendon involvement: none  Nerve involvement: none  Irrigation solution: saline  Amount of  cleaning: standard  Skin closure: glue  Dressing: non-stick sterile dressing  Patient tolerance: Patient tolerated the procedure well with no immediate complications        Labs Reviewed - No data to display            Medical Decision Making:   History:   Old Medical Records: I decided to obtain old medical records.      Beth Damian Jr. 34 y.o. presented to the ED with c/o left 5th finger laceration that occurred in the middle the night as he was cutting smoked sausage.  He reports mild pain to the area.  He does state that he cleaned the area with tap water and bandaged last night.  He continues with pain to the affected area.  He denies any numbness, tingling or pain radiation.  He denies any drainage from the site. ROS positive for laceration.  Physical exam reveals patient in no distress. 5th digit of L hand:  L shaped flap laceration along volar aspect; no bleeding; edges are well approximated; new granulation noted; no nail or tendon involvement.  Flap along distal aspect appears avascular however good vascular flow noted to proximal aspect. FROM of all joints on affected extremities, sensation and capillary refill intact.     DDX: laceration simple versus complicated, skin avulsion,    ED management: wound cleaned and irrigated, lac repair; see procedure note.  Toradol with reduction of pain. No x-ray warranted at this time as wound is a superficial laceration mechanism with sharp edge. He was counseled about the risk of infection and wound care.  Review of chart reveals that his last tetanus was in 2015.    Impression/Plan: The encounter diagnosis was Laceration of left little finger without foreign body without damage to nail, initial encounter.Patient will follow up with Primary.  Patient cautioned on when to return to ED.  Pt. Understands and agrees with current treatment plan                                Clinical Impression:       ICD-10-CM ICD-9-CM   1. Laceration of left little finger without  foreign body without damage to nail, initial encounter S61.217A 883.0                  Scribe Attestation I, Lidia delacruz, personally performed the services described in this documentation. All medical record entries made by the scribe were at my direction and in my presence.  I have reviewed the chart and agree that the record reflects my personal performance and is accurate and complete. Lidia Delacruz APC.  12:50 PM 12/17/2019               JENNA Reyes  12/17/19 1254

## 2019-12-17 NOTE — ED TRIAGE NOTES
Pt presents to ED and reports he cut his L 5th digit at 0200 this am while cutting smoke sausage. Pt states pain is 8/10 at this time and has not taking any medication. Bleeding controled at this time.

## 2019-12-23 ENCOUNTER — HOSPITAL ENCOUNTER (EMERGENCY)
Facility: HOSPITAL | Age: 34
Discharge: HOME | End: 2019-12-23
Attending: EMERGENCY MEDICINE
Payer: MEDICAID

## 2019-12-23 VITALS
BODY MASS INDEX: 38.26 KG/M2 | HEART RATE: 85 BPM | RESPIRATION RATE: 18 BRPM | SYSTOLIC BLOOD PRESSURE: 148 MMHG | DIASTOLIC BLOOD PRESSURE: 78 MMHG | WEIGHT: 290 LBS | OXYGEN SATURATION: 94 % | TEMPERATURE: 98 F

## 2019-12-23 DIAGNOSIS — S61.217D LACERATION OF LEFT LITTLE FINGER WITHOUT FOREIGN BODY WITHOUT DAMAGE TO NAIL, SUBSEQUENT ENCOUNTER: Primary | ICD-10-CM

## 2019-12-23 DIAGNOSIS — L08.9 WOUND INFECTION: ICD-10-CM

## 2019-12-23 DIAGNOSIS — T14.8XXA WOUND INFECTION: ICD-10-CM

## 2019-12-23 PROBLEM — S61.217A LACERATION OF LEFT LITTLE FINGER WITHOUT FOREIGN BODY WITHOUT DAMAGE TO NAIL: Status: ACTIVE | Noted: 2019-12-23

## 2019-12-23 LAB — POCT GLUCOSE: 92 MG/DL (ref 70–110)

## 2019-12-23 PROCEDURE — 82962 GLUCOSE BLOOD TEST: CPT

## 2019-12-23 PROCEDURE — 99283 EMERGENCY DEPT VISIT LOW MDM: CPT

## 2019-12-23 RX ORDER — CIPROFLOXACIN 500 MG/1
500 TABLET ORAL 2 TIMES DAILY
Qty: 14 TABLET | Refills: 0 | Status: SHIPPED | OUTPATIENT
Start: 2019-12-23 | End: 2019-12-30

## 2019-12-23 NOTE — ED TRIAGE NOTES
Pt presents to ED for wound check of L 5th digit. Pt was seen in the ED after cutting his L 5th digit with a knife while cutting x6 days ago. Pt states pain is 10/10 and that pain medication has not been working. Pt states the glue is coming off. He states he has been getting it wet. .......................left pinky finger laseration last tues, treated with derma bond, pt here for recheck)

## 2019-12-23 NOTE — ED PROVIDER NOTES
Encounter Date: 12/23/2019    SCRIBE #1 NOTE: I, Michelle Ahumada, am scribing for, and in the presence of,  THI Paz. I have scribed the following portions of the note - Other sections scribed: HPI, ROS, PE.       History     Chief Complaint   Patient presents with    Finger Injury     left pinky finger laseration last tues, treated with derma bond, pt here for recheck     Time seen by provider: 5:30 PM    34 y.o. male with a history of HTN, HLD, and DM who presents to the ED with complaint of a laceration to his left pinky finger. Patient says last Tuesday he came to the ED after cutting a piece of sausage and accidentally cutting the tip of his left pinky finger. No nail involvement. Patient's wound was treated with dermabond in the ED and was discharged with follow up instructions with his PCP. Patient denies discharge, numbness, tingling, or any other complaints at this time. He has been taking Tylenol for his pain but denies taking Ibuprofen.    The history is provided by the patient.     Review of patient's allergies indicates:  No Known Allergies  Past Medical History:   Diagnosis Date    Diabetes mellitus     Hypercholesteremia     Hypertension      No past surgical history on file.  Family History   Problem Relation Age of Onset    Hypertension Mother     Hypertension Father     Diabetes Brother     Diabetes Daughter     Diabetes Maternal Grandmother     Diabetes Paternal Grandfather      Social History     Tobacco Use    Smoking status: Current Every Day Smoker     Packs/day: 0.50     Years: 9.00     Pack years: 4.50     Types: Cigarettes    Smokeless tobacco: Never Used    Tobacco comment: cigars   Substance Use Topics    Alcohol use: Yes     Comment: occasional    Drug use: No     Review of Systems   Constitutional: Negative for chills and fever.   HENT: Negative for sore throat.    Respiratory: Negative for cough.    Cardiovascular: Negative for chest pain.   Gastrointestinal:  Negative for abdominal pain.   Genitourinary: Negative for dysuria.   Musculoskeletal: Negative for back pain and myalgias.   Skin: Positive for wound. Negative for rash.   Neurological: Negative for weakness.   Hematological: Does not bruise/bleed easily.   All other systems reviewed and are negative.    Physical Exam     Initial Vitals [12/23/19 1708]   BP Pulse Resp Temp SpO2   (!) 148/78 85 18 98.1 °F (36.7 °C) (!) 94 %      MAP       --         Physical Exam    Nursing note and vitals reviewed.  Constitutional: He appears well-developed and well-nourished.   HENT:   Head: Normocephalic and atraumatic.   Eyes: Conjunctivae and EOM are normal. Pupils are equal, round, and reactive to light.   Cardiovascular: Normal rate, regular rhythm, normal heart sounds and intact distal pulses. Exam reveals no gallop and no friction rub.    No murmur heard.  Pulmonary/Chest: Breath sounds normal. No respiratory distress. He has no wheezes. He has no rhonchi. He has no rales. He exhibits no tenderness.   Abdominal:   Obese abdomen   Musculoskeletal: Normal range of motion.   Non healing 5th digit of L hand. No discharge or bleeding.   Neurological: He is alert and oriented to person, place, and time. He has normal strength. GCS score is 15. GCS eye subscore is 4. GCS verbal subscore is 5. GCS motor subscore is 6.   Skin: Capillary refill takes less than 2 seconds.   Poor healing laceration noted to the left 5th digit with a foul odor coming from the wound- no drainage noted but it is painful to palpation consistent with the beginning of an infection       ED Course   Procedures  Labs Reviewed   POCT GLUCOSE MONITORING CONTINUOUS             Medical Decision Making:   Initial Assessment:   34 y.o. male with a history of HTN, HLD, and DM who presents to the ED with complaint of a laceration to his left pinky finger. Patient says last Tuesday he came to the ED after cutting a piece of sausage and accidentally cutting the tip of  his left pinky finger. No nail involvement. Patient's wound was treated with dermabond in the ED and was discharged with follow up instructions with his PCP. Patient denies discharge, numbness, tingling, or any other complaints at this time. He has been taking Tylenol for his pain but denies taking Ibuprofen.    Differential Diagnosis:    Finger laceration, poor wound healing, wound  infection  Clinical Tests:   Lab Tests: Ordered and Reviewed       <> Summary of Lab:  Blood glucose within normal limits  ED Management:   Patient examined noted to have a healing laceration to the left 5th digit but it is not healing appropriately most likely secondary to his diabetes and onset of a new infection.  The wound appears to have stayed wet as some of the skin is macerated.  Patient advised to keep the wound dry and to wash with soap and water twice a day.  He has been given Cipro as the wound smells like Pseudomonas.  Patient advised to return to the emergency room with any complications. Patient given strict return precautions and voiced understanding of all discharge instructions. Pt was stable at discharge.                        ED Course as of Dec 23 1742   Mon Dec 23, 2019   1711 BP(!): 148/78 [AT]   1711 Temp: 98.1 °F (36.7 °C) [AT]   1711 Temp src: Oral [AT]   1711 Pulse: 85 [AT]   1711 Resp: 18 [AT]   1711 SpO2(!): 94 % [AT]      ED Course User Index  [AT] THI Castillo                Clinical Impression:     1. Laceration of left little finger without foreign body without damage to nail, subsequent encounter    2. Wound infection        Disposition:   Disposition: Discharged  Condition: Stable      Scribe attestation This document was produced by a scribe under my direction and in my presence. I agree with the content of the note and have made any necessary edits.     Hannah Quezada DNP, FNP-BC                 THI Castillo  12/23/19 1908

## 2020-11-09 ENCOUNTER — HOSPITAL ENCOUNTER (EMERGENCY)
Facility: HOSPITAL | Age: 35
Discharge: HOME | End: 2020-11-10
Attending: EMERGENCY MEDICINE
Payer: MEDICAID

## 2020-11-09 DIAGNOSIS — M79.604 RIGHT LEG PAIN: ICD-10-CM

## 2020-11-09 DIAGNOSIS — M79.606 LEG PAIN: ICD-10-CM

## 2020-11-09 PROCEDURE — 96372 THER/PROPH/DIAG INJ SC/IM: CPT

## 2020-11-09 PROCEDURE — 63600175 PHARM REV CODE 636 W HCPCS: Performed by: PHYSICIAN ASSISTANT

## 2020-11-09 PROCEDURE — 99284 EMERGENCY DEPT VISIT MOD MDM: CPT | Mod: 25

## 2020-11-09 PROCEDURE — 99285 EMERGENCY DEPT VISIT HI MDM: CPT | Mod: 25

## 2020-11-09 RX ORDER — KETOROLAC TROMETHAMINE 30 MG/ML
15 INJECTION, SOLUTION INTRAMUSCULAR; INTRAVENOUS
Status: COMPLETED | OUTPATIENT
Start: 2020-11-09 | End: 2020-11-09

## 2020-11-09 RX ADMIN — KETOROLAC TROMETHAMINE 15 MG: 30 INJECTION, SOLUTION INTRAMUSCULAR; INTRAVENOUS at 09:11

## 2020-11-10 VITALS
RESPIRATION RATE: 18 BRPM | TEMPERATURE: 98 F | OXYGEN SATURATION: 96 % | DIASTOLIC BLOOD PRESSURE: 82 MMHG | HEIGHT: 73 IN | HEART RATE: 84 BPM | BODY MASS INDEX: 41.75 KG/M2 | WEIGHT: 315 LBS | SYSTOLIC BLOOD PRESSURE: 132 MMHG

## 2020-11-10 NOTE — ED NOTES
Patient identifiers for Beth Damian Jr. checked and correct.    LOC: The patient is awake, alert and aware of environment with an appropriate affect, the patient is oriented x 3 and speaking appropriately.  APPEARANCE: Patient resting comfortably and in no acute distress, patient is clean and well groomed, patient's clothing are properly fastened.  SKIN: The skin is warm and dry, patient has age appropriate skin turgor and moist mucus membranes, skin intact, no breakdown or bruising noted.  MUSCULOSKELETAL: Patient moving all extremities well, no obvious swelling or deformities noted.  RESPIRATORY: Airway is open and patent, respirations are spontaneous, patient has a normal effort and rate, no accessory muscle use noted.  Clear breath sounds bilaterally.  CARDIAC: Patient has a normal rate and rhythm, no periphreal edema noted, capillary refill < 3 seconds.  ABDOMEN: Soft and non tender to palpation, no distention noted.  Normoactive bowel sounds x4.  NEUROLOGIC: PERRL, facial expression is symmetrical, bilateral hand grasp equal and even, normal sensation in all extremities when touched with a finger.

## 2020-11-10 NOTE — ED PROVIDER NOTES
Encounter Date: 11/9/2020       History     Chief Complaint   Patient presents with    Leg Pain     right lateral leg pain x 2 weeks, pt complains of mole on leg that hurts when being touched by pants, no redness no swelling, walked with steady gait      35-year-old male presents to ED with complaint of right leg pain and mild swelling that began roughly 2 weeks ago with no associated injury or trauma.  Pain originally began in right posterior calf (near skin mole), but has since radiated into right posterior thigh.  No continued calf pain.  He reports pain was gradual onset, described as sharp, worsened with activities but also continued at rest, severity 9/10.  He denies history DVT, recent travel, recent surgeries. He is a smoker.  No associated fever, chills, nausea, vomiting, chest or abdominal pain, cough shortness of breath.  No other acute complaints at this time.       The history is provided by the patient.     Review of patient's allergies indicates:  No Known Allergies  Past Medical History:   Diagnosis Date    Diabetes mellitus     Hypercholesteremia     Hypertension      No past surgical history on file.  Family History   Problem Relation Age of Onset    Hypertension Mother     Hypertension Father     Diabetes Brother     Diabetes Daughter     Diabetes Maternal Grandmother     Diabetes Paternal Grandfather      Social History     Tobacco Use    Smoking status: Current Every Day Smoker     Packs/day: 0.50     Years: 9.00     Pack years: 4.50     Types: Cigarettes    Smokeless tobacco: Never Used    Tobacco comment: cigars   Substance Use Topics    Alcohol use: Yes     Comment: occasional    Drug use: No     Review of Systems   Constitutional: Negative for activity change, chills and fever.   Respiratory: Negative for cough and shortness of breath.    Cardiovascular: Negative for chest pain.   Gastrointestinal: Negative for abdominal pain, nausea and vomiting.   Musculoskeletal: Positive  for gait problem and myalgias. Negative for back pain, joint swelling, neck pain and neck stiffness.   Skin: Negative for color change and wound.   Neurological: Negative for dizziness, weakness, light-headedness, numbness and headaches.       Physical Exam     Initial Vitals [11/09/20 1823]   BP Pulse Resp Temp SpO2   (!) 142/96 94 17 98.7 °F (37.1 °C) 96 %      MAP       --         Physical Exam    Nursing note and vitals reviewed.  Constitutional: He appears well-developed and well-nourished. He is cooperative.  Non-toxic appearance. He does not have a sickly appearance. He does not appear ill. No distress.   HENT:   Head: Normocephalic and atraumatic.   Eyes: EOM are normal.   Neck: Normal range of motion. Neck supple.   Cardiovascular: Normal rate, regular rhythm and normal heart sounds.   Pulmonary/Chest: Effort normal and breath sounds normal.   Abdominal: Soft. Normal appearance.   Musculoskeletal: Tenderness present.        Legs:       Comments: Right thigh tenderness over posterior/medial hamstring, extending to medial aspect of right knee.  No palpable abnormalities. No warmth. Pain worsened with knee flexion against resistance but minimally changed with knee extension.  No posterior calf tenderness. No palpable cord.  No noticeable swelling right lower extremity when compared to left.   Neurological: He is alert and oriented to person, place, and time. GCS score is 15. GCS eye subscore is 4. GCS verbal subscore is 5. GCS motor subscore is 6.   Skin: Skin is warm and dry. No bruising and no rash noted. No erythema.   Psychiatric: He has a normal mood and affect. His behavior is normal. Thought content normal.         ED Course   Procedures  Labs Reviewed - No data to display       Imaging Results    None          Medical Decision Making:   Differential Diagnosis:   Musculoskeletal strain, sprain, intramuscular injury, DVT, neuropathy  ED Management:  Toradol, US RLE    Patient presents with 2 week onset  gradually worsening right lower extremity pain and mild swelling, beginning and right calf but radiating towards right posterior thigh. No associated injury or trauma. Denies history DVT, recent travel, recent surgeries, but is a smoker.  On exam, tenderness noted into right posterior hamstring tendon with no calf tenderness or noticeable lower extremity swelling when to left.  I suspect symptoms are musculoskeletal, but will proceed with RLE ultrasound to rule out DVT.  No current signs or symptoms of infection.  He has been given Toradol for his symptoms.  Patient was discussed with and handed off to Dr. Lefort at shift change.                              Clinical Impression:       ICD-10-CM ICD-9-CM   1. Right leg pain  M79.604 729.5   2. Leg pain  M79.606 729.5                                               Alexx Stubbs PA-C  11/09/20 4879

## 2020-11-10 NOTE — FIRST PROVIDER EVALUATION
Emergency Department TeleTriage Encounter Note      CHIEF COMPLAINT    Chief Complaint   Patient presents with    Leg Pain     right lateral leg pain x 2 weeks, pt complains of mole on leg that hurts when being touched by pants, no redness no swelling, walked with steady gait        VITAL SIGNS   Initial Vitals [11/09/20 1823]   BP Pulse Resp Temp SpO2   (!) 142/96 94 17 98.7 °F (37.1 °C) 96 %      MAP       --            ALLERGIES    Review of patient's allergies indicates:  No Known Allergies    PROVIDER TRIAGE NOTE  This is a teletriage evaluation of a 35 y.o. male presenting to the ED with c/o right medial proximal lower leg. No injuries or trauma. Pain is around mole on the leg. Initial orders will be placed and care will be transferred to an alternate provider when patient is roomed for a full evaluation. Any additional orders and the final disposition will be determined by that provider.       ORDERS  Labs Reviewed - No data to display    ED Orders (720h ago, onward)    None            Virtual Visit Note: The provider triage portion of this emergency department evaluation and documentation was performed via [employer], a HIPAA-compliant telemedicine application, in concert with a tele-presenter in the room. A face to face patient evaluation with one of my colleagues will occur once the patient is placed in an emergency department room.      DISCLAIMER: This note was prepared with [employer] voice recognition transcription software. Garbled syntax, mangled pronouns, and other bizarre constructions may be attributed to that software system.

## 2020-12-08 ENCOUNTER — HOSPITAL ENCOUNTER (INPATIENT)
Facility: HOSPITAL | Age: 35
LOS: 4 days | Discharge: HOME | DRG: 163 | End: 2020-12-12
Attending: EMERGENCY MEDICINE | Admitting: HOSPITALIST
Payer: MEDICAID

## 2020-12-08 DIAGNOSIS — R73.03 PREDIABETES: ICD-10-CM

## 2020-12-08 DIAGNOSIS — R07.9 CHEST PAIN: ICD-10-CM

## 2020-12-08 DIAGNOSIS — I26.99 PULMONARY EMBOLISM, UNSPECIFIED CHRONICITY, UNSPECIFIED PULMONARY EMBOLISM TYPE, UNSPECIFIED WHETHER ACUTE COR PULMONALE PRESENT: ICD-10-CM

## 2020-12-08 DIAGNOSIS — Z11.59 SPECIAL SCREENING EXAMINATION FOR UNSPECIFIED VIRAL DISEASE: ICD-10-CM

## 2020-12-08 DIAGNOSIS — E78.2 MIXED HYPERLIPIDEMIA: ICD-10-CM

## 2020-12-08 DIAGNOSIS — I82.491 ACUTE DEEP VEIN THROMBOSIS (DVT) OF OTHER SPECIFIED VEIN OF RIGHT LOWER EXTREMITY: ICD-10-CM

## 2020-12-08 DIAGNOSIS — I10 ESSENTIAL HYPERTENSION: ICD-10-CM

## 2020-12-08 DIAGNOSIS — I26.99 PULMONARY EMBOLUS: Primary | ICD-10-CM

## 2020-12-08 DIAGNOSIS — I21.4 NSTEMI (NON-ST ELEVATED MYOCARDIAL INFARCTION): ICD-10-CM

## 2020-12-08 LAB
ALBUMIN SERPL BCP-MCNC: 3.7 G/DL (ref 3.5–5.2)
ALP SERPL-CCNC: 87 U/L (ref 55–135)
ALT SERPL W/O P-5'-P-CCNC: 22 U/L (ref 10–44)
ANION GAP SERPL CALC-SCNC: 16 MMOL/L (ref 8–16)
AST SERPL-CCNC: 21 U/L (ref 10–40)
BASOPHILS # BLD AUTO: 0.02 K/UL (ref 0–0.2)
BASOPHILS NFR BLD: 0.2 % (ref 0–1.9)
BILIRUB SERPL-MCNC: 0.4 MG/DL (ref 0.1–1)
BNP SERPL-MCNC: 49 PG/ML (ref 0–99)
BUN SERPL-MCNC: 13 MG/DL (ref 6–20)
CALCIUM SERPL-MCNC: 9.2 MG/DL (ref 8.7–10.5)
CHLORIDE SERPL-SCNC: 105 MMOL/L (ref 95–110)
CO2 SERPL-SCNC: 20 MMOL/L (ref 23–29)
CREAT SERPL-MCNC: 1.2 MG/DL (ref 0.5–1.4)
CTP QC/QA: YES
DIFFERENTIAL METHOD: ABNORMAL
EOSINOPHIL # BLD AUTO: 0.1 K/UL (ref 0–0.5)
EOSINOPHIL NFR BLD: 0.9 % (ref 0–8)
ERYTHROCYTE [DISTWIDTH] IN BLOOD BY AUTOMATED COUNT: 13.1 % (ref 11.5–14.5)
EST. GFR  (AFRICAN AMERICAN): >60 ML/MIN/1.73 M^2
EST. GFR  (NON AFRICAN AMERICAN): >60 ML/MIN/1.73 M^2
GLUCOSE SERPL-MCNC: 135 MG/DL (ref 70–110)
HCT VFR BLD AUTO: 44.2 % (ref 40–54)
HGB BLD-MCNC: 14.8 G/DL (ref 14–18)
IMM GRANULOCYTES # BLD AUTO: 0.04 K/UL (ref 0–0.04)
IMM GRANULOCYTES NFR BLD AUTO: 0.3 % (ref 0–0.5)
LYMPHOCYTES # BLD AUTO: 2.6 K/UL (ref 1–4.8)
LYMPHOCYTES NFR BLD: 21.8 % (ref 18–48)
MCH RBC QN AUTO: 28.1 PG (ref 27–31)
MCHC RBC AUTO-ENTMCNC: 33.5 G/DL (ref 32–36)
MCV RBC AUTO: 84 FL (ref 82–98)
MONOCYTES # BLD AUTO: 0.6 K/UL (ref 0.3–1)
MONOCYTES NFR BLD: 4.7 % (ref 4–15)
NEUTROPHILS # BLD AUTO: 8.4 K/UL (ref 1.8–7.7)
NEUTROPHILS NFR BLD: 72.1 % (ref 38–73)
NRBC BLD-RTO: 0 /100 WBC
PLATELET # BLD AUTO: 267 K/UL (ref 150–350)
PMV BLD AUTO: 9.7 FL (ref 9.2–12.9)
POTASSIUM SERPL-SCNC: 3.7 MMOL/L (ref 3.5–5.1)
PROT SERPL-MCNC: 8.4 G/DL (ref 6–8.4)
RBC # BLD AUTO: 5.26 M/UL (ref 4.6–6.2)
SARS-COV-2 RDRP RESP QL NAA+PROBE: NEGATIVE
SODIUM SERPL-SCNC: 141 MMOL/L (ref 136–145)
TROPONIN I SERPL DL<=0.01 NG/ML-MCNC: 1.1 NG/ML (ref 0–0.03)
WBC # BLD AUTO: 11.69 K/UL (ref 3.9–12.7)

## 2020-12-08 PROCEDURE — 12000002 HC ACUTE/MED SURGE SEMI-PRIVATE ROOM

## 2020-12-08 PROCEDURE — 84484 ASSAY OF TROPONIN QUANT: CPT

## 2020-12-08 PROCEDURE — 25000003 PHARM REV CODE 250: Performed by: NURSE PRACTITIONER

## 2020-12-08 PROCEDURE — 85025 COMPLETE CBC W/AUTO DIFF WBC: CPT

## 2020-12-08 PROCEDURE — 80061 LIPID PANEL: CPT

## 2020-12-08 PROCEDURE — 93005 ELECTROCARDIOGRAM TRACING: CPT

## 2020-12-08 PROCEDURE — 96375 TX/PRO/DX INJ NEW DRUG ADDON: CPT

## 2020-12-08 PROCEDURE — 80053 COMPREHEN METABOLIC PANEL: CPT

## 2020-12-08 PROCEDURE — 83880 ASSAY OF NATRIURETIC PEPTIDE: CPT

## 2020-12-08 PROCEDURE — 63600175 PHARM REV CODE 636 W HCPCS: Performed by: NURSE PRACTITIONER

## 2020-12-08 PROCEDURE — 93010 ELECTROCARDIOGRAM REPORT: CPT | Mod: ,,, | Performed by: INTERNAL MEDICINE

## 2020-12-08 PROCEDURE — 93010 EKG 12-LEAD: ICD-10-PCS | Mod: ,,, | Performed by: INTERNAL MEDICINE

## 2020-12-08 PROCEDURE — 99285 EMERGENCY DEPT VISIT HI MDM: CPT | Mod: 25

## 2020-12-08 PROCEDURE — U0002 COVID-19 LAB TEST NON-CDC: HCPCS | Performed by: EMERGENCY MEDICINE

## 2020-12-08 RX ORDER — MORPHINE SULFATE 4 MG/ML
4 INJECTION, SOLUTION INTRAMUSCULAR; INTRAVENOUS
Status: COMPLETED | OUTPATIENT
Start: 2020-12-08 | End: 2020-12-08

## 2020-12-08 RX ORDER — ASPIRIN 325 MG
325 TABLET, DELAYED RELEASE (ENTERIC COATED) ORAL
Status: COMPLETED | OUTPATIENT
Start: 2020-12-08 | End: 2020-12-08

## 2020-12-08 RX ORDER — ONDANSETRON 2 MG/ML
4 INJECTION INTRAMUSCULAR; INTRAVENOUS
Status: COMPLETED | OUTPATIENT
Start: 2020-12-08 | End: 2020-12-08

## 2020-12-08 RX ADMIN — MORPHINE SULFATE 4 MG: 4 INJECTION INTRAVENOUS at 11:12

## 2020-12-08 RX ADMIN — ASPIRIN 325 MG: 325 TABLET, COATED ORAL at 11:12

## 2020-12-08 RX ADMIN — ONDANSETRON 4 MG: 2 INJECTION INTRAMUSCULAR; INTRAVENOUS at 11:12

## 2020-12-08 RX ADMIN — NITROGLYCERIN 0.5 INCH: 20 OINTMENT TOPICAL at 11:12

## 2020-12-08 NOTE — Clinical Note
250 ml injected throughout the case. 200 mL total wasted during the case. 450 mL total used in the case.

## 2020-12-08 NOTE — Clinical Note
Angiography performed of the middle RPA. via power injection. Injection was performed with 30 mL contrast at 15 mL/s. .

## 2020-12-08 NOTE — Clinical Note
The balloon is inserted in to the  proximal rt femoral vein. Multiple inflations to the entire femoral vein performed.

## 2020-12-08 NOTE — Clinical Note
Angiography performed of the middle LPA. via power injection. Injection was performed with 30 mL contrast at 15 mL/s. .

## 2020-12-09 PROBLEM — I26.99 PULMONARY EMBOLUS: Status: ACTIVE | Noted: 2020-12-09

## 2020-12-09 PROBLEM — Z11.59 SPECIAL SCREENING EXAMINATION FOR UNSPECIFIED VIRAL DISEASE: Status: ACTIVE | Noted: 2020-12-09

## 2020-12-09 PROBLEM — I21.4 NSTEMI (NON-ST ELEVATED MYOCARDIAL INFARCTION): Status: ACTIVE | Noted: 2020-12-09

## 2020-12-09 LAB
APTT BLDCRRT: 30.5 SEC (ref 21–32)
APTT BLDCRRT: 30.9 SEC (ref 21–32)
APTT BLDCRRT: 54.9 SEC (ref 21–32)
BASOPHILS # BLD AUTO: 0.01 K/UL (ref 0–0.2)
BASOPHILS NFR BLD: 0.1 % (ref 0–1.9)
BSA FOR ECHO PROCEDURE: 2.74 M2
CHOLEST SERPL-MCNC: 193 MG/DL (ref 120–199)
CHOLEST/HDLC SERPL: 6.9 {RATIO} (ref 2–5)
CV ECHO LV RWT: 0.41 CM
D DIMER PPP IA.FEU-MCNC: 17.38 MG/L FEU
DIFFERENTIAL METHOD: ABNORMAL
ECHO LV POSTERIOR WALL: 0.9 CM (ref 0.6–1.1)
EOSINOPHIL # BLD AUTO: 0.1 K/UL (ref 0–0.5)
EOSINOPHIL NFR BLD: 0.5 % (ref 0–8)
ERYTHROCYTE [DISTWIDTH] IN BLOOD BY AUTOMATED COUNT: 13.2 % (ref 11.5–14.5)
ESTIMATED AVG GLUCOSE: 160 MG/DL (ref 68–131)
FRACTIONAL SHORTENING: 21 % (ref 28–44)
HBA1C MFR BLD HPLC: 7.2 % (ref 4–5.6)
HCT VFR BLD AUTO: 39.9 % (ref 40–54)
HDLC SERPL-MCNC: 28 MG/DL (ref 40–75)
HDLC SERPL: 14.5 % (ref 20–50)
HGB BLD-MCNC: 13.2 G/DL (ref 14–18)
IMM GRANULOCYTES # BLD AUTO: 0.04 K/UL (ref 0–0.04)
IMM GRANULOCYTES NFR BLD AUTO: 0.4 % (ref 0–0.5)
INR PPP: 1.1 (ref 0.8–1.2)
INTERVENTRICULAR SEPTUM: 0.76 CM (ref 0.6–1.1)
LDLC SERPL CALC-MCNC: 131.6 MG/DL (ref 63–159)
LEFT ATRIUM SIZE: 3.15 CM
LEFT INTERNAL DIMENSION IN SYSTOLE: 3.45 CM (ref 2.1–4)
LEFT VENTRICLE DIASTOLIC VOLUME INDEX: 32.67 ML/M2
LEFT VENTRICLE DIASTOLIC VOLUME: 86.06 ML
LEFT VENTRICLE MASS INDEX: 43 G/M2
LEFT VENTRICLE SYSTOLIC VOLUME INDEX: 18.6 ML/M2
LEFT VENTRICLE SYSTOLIC VOLUME: 48.98 ML
LEFT VENTRICULAR INTERNAL DIMENSION IN DIASTOLE: 4.37 CM (ref 3.5–6)
LEFT VENTRICULAR MASS: 113.59 G
LYMPHOCYTES # BLD AUTO: 3.5 K/UL (ref 1–4.8)
LYMPHOCYTES NFR BLD: 30.4 % (ref 18–48)
MCH RBC QN AUTO: 28.3 PG (ref 27–31)
MCHC RBC AUTO-ENTMCNC: 33.1 G/DL (ref 32–36)
MCV RBC AUTO: 85 FL (ref 82–98)
MONOCYTES # BLD AUTO: 0.5 K/UL (ref 0.3–1)
MONOCYTES NFR BLD: 4.2 % (ref 4–15)
NEUTROPHILS # BLD AUTO: 7.4 K/UL (ref 1.8–7.7)
NEUTROPHILS NFR BLD: 64.4 % (ref 38–73)
NONHDLC SERPL-MCNC: 165 MG/DL
NRBC BLD-RTO: 0 /100 WBC
PISA TR MAX VEL: 3.33 M/S
PLATELET # BLD AUTO: 280 K/UL (ref 150–350)
PMV BLD AUTO: 10.3 FL (ref 9.2–12.9)
POCT GLUCOSE: 116 MG/DL (ref 70–110)
POCT GLUCOSE: 120 MG/DL (ref 70–110)
POCT GLUCOSE: 126 MG/DL (ref 70–110)
POCT GLUCOSE: 129 MG/DL (ref 70–110)
PROTHROMBIN TIME: 11.6 SEC (ref 9–12.5)
RBC # BLD AUTO: 4.67 M/UL (ref 4.6–6.2)
RIGHT VENTRICULAR END-DIASTOLIC DIMENSION: 3.28 CM
TR MAX PG: 44 MMHG
TRICUSPID ANNULAR PLANE SYSTOLIC EXCURSION: 1.95 CM
TRIGL SERPL-MCNC: 167 MG/DL (ref 30–150)
TROPONIN I SERPL DL<=0.01 NG/ML-MCNC: 0.75 NG/ML (ref 0–0.03)
TROPONIN I SERPL DL<=0.01 NG/ML-MCNC: 1.23 NG/ML (ref 0–0.03)
WBC # BLD AUTO: 11.4 K/UL (ref 3.9–12.7)

## 2020-12-09 PROCEDURE — 36415 COLL VENOUS BLD VENIPUNCTURE: CPT

## 2020-12-09 PROCEDURE — 25500020 PHARM REV CODE 255: Performed by: HOSPITALIST

## 2020-12-09 PROCEDURE — 75743 PR  ANGIO PULMON BILAT SELECT: ICD-10-PCS | Mod: 26,59,, | Performed by: INTERNAL MEDICINE

## 2020-12-09 PROCEDURE — C1894 INTRO/SHEATH, NON-LASER: HCPCS | Performed by: INTERNAL MEDICINE

## 2020-12-09 PROCEDURE — 25000003 PHARM REV CODE 250: Performed by: INTERNAL MEDICINE

## 2020-12-09 PROCEDURE — 25500020 PHARM REV CODE 255: Performed by: INTERNAL MEDICINE

## 2020-12-09 PROCEDURE — 63600175 PHARM REV CODE 636 W HCPCS: Performed by: EMERGENCY MEDICINE

## 2020-12-09 PROCEDURE — 25000003 PHARM REV CODE 250: Performed by: EMERGENCY MEDICINE

## 2020-12-09 PROCEDURE — 63600175 PHARM REV CODE 636 W HCPCS: Performed by: INTERNAL MEDICINE

## 2020-12-09 PROCEDURE — 99153 MOD SED SAME PHYS/QHP EA: CPT | Performed by: INTERNAL MEDICINE

## 2020-12-09 PROCEDURE — 75743 ARTERY X-RAYS LUNGS: CPT | Mod: 26,59,, | Performed by: INTERNAL MEDICINE

## 2020-12-09 PROCEDURE — C1760 CLOSURE DEV, VASC: HCPCS | Performed by: INTERNAL MEDICINE

## 2020-12-09 PROCEDURE — 36014 PLACE CATHETER IN ARTERY: CPT | Mod: 50,,, | Performed by: INTERNAL MEDICINE

## 2020-12-09 PROCEDURE — 37184 PRIM ART M-THRMBC 1ST VSL: CPT | Performed by: INTERNAL MEDICINE

## 2020-12-09 PROCEDURE — 36014 PLACE CATHETER IN ARTERY: CPT | Mod: 50 | Performed by: INTERNAL MEDICINE

## 2020-12-09 PROCEDURE — 85347 COAGULATION TIME ACTIVATED: CPT | Performed by: INTERNAL MEDICINE

## 2020-12-09 PROCEDURE — 99900035 HC TECH TIME PER 15 MIN (STAT)

## 2020-12-09 PROCEDURE — 37185 PRIM ART M-THRMBC SBSQ VSL: CPT | Mod: ,,, | Performed by: INTERNAL MEDICINE

## 2020-12-09 PROCEDURE — 27000221 HC OXYGEN, UP TO 24 HOURS

## 2020-12-09 PROCEDURE — 25000003 PHARM REV CODE 250: Performed by: NURSE PRACTITIONER

## 2020-12-09 PROCEDURE — 83036 HEMOGLOBIN GLYCOSYLATED A1C: CPT

## 2020-12-09 PROCEDURE — 94761 N-INVAS EAR/PLS OXIMETRY MLT: CPT

## 2020-12-09 PROCEDURE — 99291 CRITICAL CARE FIRST HOUR: CPT | Mod: 25,,, | Performed by: NURSE PRACTITIONER

## 2020-12-09 PROCEDURE — C1757 CATH, THROMBECTOMY/EMBOLECT: HCPCS | Performed by: INTERNAL MEDICINE

## 2020-12-09 PROCEDURE — 37184 PR THROMBECTOMY, NON CORONARY, 1ST VESSEL: ICD-10-PCS | Mod: ,,, | Performed by: INTERNAL MEDICINE

## 2020-12-09 PROCEDURE — 84484 ASSAY OF TROPONIN QUANT: CPT | Mod: 91

## 2020-12-09 PROCEDURE — 99152 MOD SED SAME PHYS/QHP 5/>YRS: CPT | Performed by: INTERNAL MEDICINE

## 2020-12-09 PROCEDURE — 84484 ASSAY OF TROPONIN QUANT: CPT

## 2020-12-09 PROCEDURE — 85025 COMPLETE CBC W/AUTO DIFF WBC: CPT

## 2020-12-09 PROCEDURE — 85730 THROMBOPLASTIN TIME PARTIAL: CPT | Mod: 91

## 2020-12-09 PROCEDURE — 36014 PR PLACE CATH IN LT/RT PULM ART: ICD-10-PCS | Mod: 50,,, | Performed by: INTERNAL MEDICINE

## 2020-12-09 PROCEDURE — 99291 PR CRITICAL CARE, E/M 30-74 MINUTES: ICD-10-PCS | Mod: 25,,, | Performed by: NURSE PRACTITIONER

## 2020-12-09 PROCEDURE — 85610 PROTHROMBIN TIME: CPT

## 2020-12-09 PROCEDURE — 37184 PRIM ART M-THRMBC 1ST VSL: CPT | Mod: ,,, | Performed by: INTERNAL MEDICINE

## 2020-12-09 PROCEDURE — 99152 MOD SED SAME PHYS/QHP 5/>YRS: CPT | Mod: ,,, | Performed by: INTERNAL MEDICINE

## 2020-12-09 PROCEDURE — 20000000 HC ICU ROOM

## 2020-12-09 PROCEDURE — 99152 PR MOD CONSCIOUS SEDATION, SAME PHYS, 5+ YRS, FIRST 15 MIN: ICD-10-PCS | Mod: ,,, | Performed by: INTERNAL MEDICINE

## 2020-12-09 PROCEDURE — 37185 PR PRIMARY ART M-THROMBECTOMY, INCL GUID & RX INJ, SBSQ VESSEL, ADD ON CODE: ICD-10-PCS | Mod: ,,, | Performed by: INTERNAL MEDICINE

## 2020-12-09 PROCEDURE — 37185 PRIM ART M-THRMBC SBSQ VSL: CPT | Performed by: INTERNAL MEDICINE

## 2020-12-09 PROCEDURE — 85730 THROMBOPLASTIN TIME PARTIAL: CPT

## 2020-12-09 PROCEDURE — 75743 ARTERY X-RAYS LUNGS: CPT | Mod: 59 | Performed by: INTERNAL MEDICINE

## 2020-12-09 PROCEDURE — 85379 FIBRIN DEGRADATION QUANT: CPT

## 2020-12-09 PROCEDURE — 96365 THER/PROPH/DIAG IV INF INIT: CPT

## 2020-12-09 PROCEDURE — 96366 THER/PROPH/DIAG IV INF ADDON: CPT

## 2020-12-09 PROCEDURE — C1769 GUIDE WIRE: HCPCS | Performed by: INTERNAL MEDICINE

## 2020-12-09 RX ORDER — HYDROCODONE BITARTRATE AND ACETAMINOPHEN 5; 325 MG/1; MG/1
1 TABLET ORAL EVERY 6 HOURS PRN
Status: DISCONTINUED | OUTPATIENT
Start: 2020-12-09 | End: 2020-12-12 | Stop reason: HOSPADM

## 2020-12-09 RX ORDER — ACETAMINOPHEN 325 MG/1
650 TABLET ORAL EVERY 4 HOURS PRN
Status: DISCONTINUED | OUTPATIENT
Start: 2020-12-09 | End: 2020-12-10 | Stop reason: SDUPTHER

## 2020-12-09 RX ORDER — INSULIN ASPART 100 [IU]/ML
0-5 INJECTION, SOLUTION INTRAVENOUS; SUBCUTANEOUS
Status: DISCONTINUED | OUTPATIENT
Start: 2020-12-09 | End: 2020-12-12 | Stop reason: HOSPADM

## 2020-12-09 RX ORDER — CEFAZOLIN SODIUM 1 G/3ML
INJECTION, POWDER, FOR SOLUTION INTRAMUSCULAR; INTRAVENOUS
Status: DISCONTINUED | OUTPATIENT
Start: 2020-12-09 | End: 2020-12-09 | Stop reason: HOSPADM

## 2020-12-09 RX ORDER — ASPIRIN 81 MG/1
81 TABLET ORAL DAILY
Status: DISCONTINUED | OUTPATIENT
Start: 2020-12-09 | End: 2020-12-12 | Stop reason: HOSPADM

## 2020-12-09 RX ORDER — HEPARIN SODIUM,PORCINE/D5W 25000/250
0-40 INTRAVENOUS SOLUTION INTRAVENOUS CONTINUOUS
Status: DISCONTINUED | OUTPATIENT
Start: 2020-12-09 | End: 2020-12-10

## 2020-12-09 RX ORDER — FENTANYL CITRATE 50 UG/ML
INJECTION, SOLUTION INTRAMUSCULAR; INTRAVENOUS
Status: DISCONTINUED | OUTPATIENT
Start: 2020-12-09 | End: 2020-12-09 | Stop reason: HOSPADM

## 2020-12-09 RX ORDER — LIDOCAINE HYDROCHLORIDE 10 MG/ML
INJECTION INFILTRATION; PERINEURAL
Status: DISCONTINUED | OUTPATIENT
Start: 2020-12-09 | End: 2020-12-09 | Stop reason: HOSPADM

## 2020-12-09 RX ORDER — IODIXANOL 320 MG/ML
INJECTION, SOLUTION INTRAVASCULAR
Status: DISCONTINUED | OUTPATIENT
Start: 2020-12-09 | End: 2020-12-09 | Stop reason: HOSPADM

## 2020-12-09 RX ORDER — LOSARTAN POTASSIUM 50 MG/1
50 TABLET ORAL DAILY
Status: DISCONTINUED | OUTPATIENT
Start: 2020-12-09 | End: 2020-12-12 | Stop reason: HOSPADM

## 2020-12-09 RX ORDER — GLUCAGON 1 MG
1 KIT INJECTION
Status: DISCONTINUED | OUTPATIENT
Start: 2020-12-09 | End: 2020-12-12 | Stop reason: HOSPADM

## 2020-12-09 RX ORDER — HEPARIN SODIUM 1000 [USP'U]/ML
INJECTION, SOLUTION INTRAVENOUS; SUBCUTANEOUS
Status: DISCONTINUED | OUTPATIENT
Start: 2020-12-09 | End: 2020-12-09 | Stop reason: HOSPADM

## 2020-12-09 RX ORDER — TRAMADOL HYDROCHLORIDE 50 MG/1
100 TABLET ORAL ONCE
Status: DISCONTINUED | OUTPATIENT
Start: 2020-12-09 | End: 2020-12-09

## 2020-12-09 RX ORDER — IBUPROFEN 200 MG
24 TABLET ORAL
Status: DISCONTINUED | OUTPATIENT
Start: 2020-12-09 | End: 2020-12-12 | Stop reason: HOSPADM

## 2020-12-09 RX ORDER — SODIUM CHLORIDE 0.9 % (FLUSH) 0.9 %
10 SYRINGE (ML) INJECTION
Status: DISCONTINUED | OUTPATIENT
Start: 2020-12-09 | End: 2020-12-12 | Stop reason: HOSPADM

## 2020-12-09 RX ORDER — CLOPIDOGREL BISULFATE 75 MG/1
600 TABLET ORAL ONCE
Status: DISCONTINUED | OUTPATIENT
Start: 2020-12-09 | End: 2020-12-09

## 2020-12-09 RX ORDER — MIDAZOLAM HYDROCHLORIDE 1 MG/ML
INJECTION INTRAMUSCULAR; INTRAVENOUS
Status: DISCONTINUED | OUTPATIENT
Start: 2020-12-09 | End: 2020-12-09 | Stop reason: HOSPADM

## 2020-12-09 RX ORDER — DIPHENHYDRAMINE HYDROCHLORIDE 50 MG/ML
INJECTION INTRAMUSCULAR; INTRAVENOUS
Status: DISCONTINUED | OUTPATIENT
Start: 2020-12-09 | End: 2020-12-09 | Stop reason: HOSPADM

## 2020-12-09 RX ORDER — ATORVASTATIN CALCIUM 40 MG/1
40 TABLET, FILM COATED ORAL DAILY
Status: DISCONTINUED | OUTPATIENT
Start: 2020-12-09 | End: 2020-12-12 | Stop reason: HOSPADM

## 2020-12-09 RX ORDER — METOPROLOL SUCCINATE 25 MG/1
25 TABLET, EXTENDED RELEASE ORAL DAILY
Status: DISCONTINUED | OUTPATIENT
Start: 2020-12-09 | End: 2020-12-12 | Stop reason: HOSPADM

## 2020-12-09 RX ORDER — ONDANSETRON 2 MG/ML
4 INJECTION INTRAMUSCULAR; INTRAVENOUS EVERY 8 HOURS PRN
Status: DISCONTINUED | OUTPATIENT
Start: 2020-12-09 | End: 2020-12-12 | Stop reason: HOSPADM

## 2020-12-09 RX ORDER — HEPARIN SODIUM 200 [USP'U]/100ML
INJECTION, SOLUTION INTRAVENOUS
Status: DISCONTINUED | OUTPATIENT
Start: 2020-12-09 | End: 2020-12-10

## 2020-12-09 RX ORDER — IBUPROFEN 200 MG
16 TABLET ORAL
Status: DISCONTINUED | OUTPATIENT
Start: 2020-12-09 | End: 2020-12-12 | Stop reason: HOSPADM

## 2020-12-09 RX ORDER — LOSARTAN POTASSIUM 50 MG/1
50 TABLET ORAL DAILY
COMMUNITY
End: 2021-01-12

## 2020-12-09 RX ORDER — CLOPIDOGREL BISULFATE 75 MG/1
75 TABLET ORAL DAILY
Status: DISCONTINUED | OUTPATIENT
Start: 2020-12-10 | End: 2020-12-09

## 2020-12-09 RX ADMIN — SODIUM CHLORIDE 1000 ML: 0.9 INJECTION, SOLUTION INTRAVENOUS at 01:12

## 2020-12-09 RX ADMIN — IOHEXOL 100 ML: 350 INJECTION, SOLUTION INTRAVENOUS at 06:12

## 2020-12-09 RX ADMIN — HYDROCODONE BITARTRATE AND ACETAMINOPHEN 1 TABLET: 5; 325 TABLET ORAL at 07:12

## 2020-12-09 RX ADMIN — HEPARIN SODIUM AND DEXTROSE 12 UNITS/KG/HR: 10000; 5 INJECTION INTRAVENOUS at 01:12

## 2020-12-09 RX ADMIN — SODIUM CHLORIDE 250 ML: 0.9 INJECTION, SOLUTION INTRAVENOUS at 03:12

## 2020-12-09 RX ADMIN — HYDROCODONE BITARTRATE AND ACETAMINOPHEN 1 TABLET: 5; 325 TABLET ORAL at 02:12

## 2020-12-09 RX ADMIN — HEPARIN SODIUM AND DEXTROSE 12 UNITS/KG/HR: 10000; 5 INJECTION INTRAVENOUS at 07:12

## 2020-12-09 NOTE — ED PROVIDER NOTES
Encounter Date: 12/8/2020       History     Chief Complaint   Patient presents with    COVID-19 Concerns     Patient states wife was tested positive for Covid on Saturday. He started with midsternal chest pain, cough, headaches, dizziness, body aches today. Denies fever.      35-year-old male presents emergency room with reports of substernal chest tightness that began this morning.  Patient reports that he has been concerned over possibility of having COVID is his wife and son are positive for COVID at this time.  Patient states since learning that his COVID test is negative the sensation his ease however he still having some tightness to the substernal aspect of his chest.  He denies shortness of breath currently.  Patient has a past medical history of diabetes, hyper cholesterolemia, hypertension.  Patient is nontoxic in appearance.    The history is provided by the patient. No  was used.     Review of patient's allergies indicates:  No Known Allergies  Past Medical History:   Diagnosis Date    Diabetes mellitus     Hypercholesteremia     Hypertension      No past surgical history on file.  Family History   Problem Relation Age of Onset    Hypertension Mother     Hypertension Father     Diabetes Brother     Diabetes Daughter     Diabetes Maternal Grandmother     Diabetes Paternal Grandfather      Social History     Tobacco Use    Smoking status: Current Every Day Smoker     Packs/day: 0.50     Years: 9.00     Pack years: 4.50     Types: Cigarettes    Smokeless tobacco: Never Used    Tobacco comment: cigars   Substance Use Topics    Alcohol use: Yes     Comment: occasional    Drug use: No     Review of Systems   Cardiovascular: Positive for chest pain.   All other systems reviewed and are negative.      Physical Exam     Initial Vitals [12/08/20 1938]   BP Pulse Resp Temp SpO2   (!) 170/101 (!) 117 18 98.6 °F (37 °C) 96 %      MAP       --         Physical Exam    Nursing note  and vitals reviewed.  Constitutional: He appears well-developed and well-nourished. He is not diaphoretic. No distress.   HENT:   Head: Normocephalic and atraumatic.   Right Ear: Hearing and tympanic membrane normal.   Left Ear: Hearing and tympanic membrane normal.   Nose: Nose normal.   Mouth/Throat: Uvula is midline, oropharynx is clear and moist and mucous membranes are normal.   Eyes: Lids are normal. Pupils are equal, round, and reactive to light.   Neck: No neck rigidity.   Cardiovascular: Tachycardia present.    Pulmonary/Chest: Effort normal and breath sounds normal. No respiratory distress. He has no wheezes. He has no rhonchi.   Abdominal: Soft. There is no abdominal tenderness.   Musculoskeletal: Normal range of motion.   Neurological: He is oriented to person, place, and time.   Skin: Skin is warm and dry. No rash noted.   Psychiatric: He has a normal mood and affect. His behavior is normal. Judgment and thought content normal.         ED Course   Procedures  Labs Reviewed   CBC W/ AUTO DIFFERENTIAL - Abnormal; Notable for the following components:       Result Value    Gran # (ANC) 8.4 (*)     All other components within normal limits   COMPREHENSIVE METABOLIC PANEL - Abnormal; Notable for the following components:    CO2 20 (*)     Glucose 135 (*)     All other components within normal limits   TROPONIN I - Abnormal; Notable for the following components:    Troponin I 1.103 (*)     All other components within normal limits   LIPID PANEL - Abnormal; Notable for the following components:    Triglycerides 167 (*)     HDL 28 (*)     HDL/Cholesterol Ratio 14.5 (*)     Total Cholesterol/HDL Ratio 6.9 (*)     All other components within normal limits   TROPONIN I - Abnormal; Notable for the following components:    Troponin I 1.233 (*)     All other components within normal limits   B-TYPE NATRIURETIC PEPTIDE   D DIMER, QUANTITATIVE   APTT   PROTIME-INR   LIPID PANEL   TROPONIN I   SARS-COV-2 RDRP GENE    POCT GLUCOSE MONITORING CONTINUOUS        ECG Results          EKG 12-lead (In process)  Result time 12/09/20 07:16:56    In process by Interface, Lab In [facility] (12/09/20 07:16:56)                 Narrative:    Test Reason : R07.9,    Vent. Rate : 115 BPM     Atrial Rate : 115 BPM     P-R Int : 148 ms          QRS Dur : 092 ms      QT Int : 334 ms       P-R-T Axes : 035 -40 017 degrees     QTc Int : 462 ms    Sinus tachycardia  Left axis deviation  Incomplete right bundle branch block  Nonspecific T wave abnormality  Abnormal ECG  When compared with ECG of 03-JUL-2018 17:29,  Vent. rate has increased BY  41 BPM  Nonspecific T wave abnormality now evident in Anterior leads    Referred By: [email or web address]   SELF           Confirmed By:                             Imaging Results    None          Medical Decision Making:   Initial Assessment:   35-year-old male presents emergency room with reports of substernal chest tightness that began this morning.  Patient reports that he has been concerned over possibility of having COVID is his wife and son are positive for COVID at this time.  Patient states since learning that his COVID test is negative the sensation his ease however he still having some tightness to the substernal aspect of his chest.  He denies shortness of breath currently.  Patient has a past medical history of diabetes, hyper cholesterolemia, hypertension.  Patient is nontoxic in appearance.    The history is provided by the patient. No  was used.     Clinical Tests:   Lab Tests: Ordered and Reviewed  Radiological Study: Ordered and Reviewed  Medical Tests: Ordered and Reviewed  ED Management:  ED Course as of Dec 09 1359  Tue Dec 08, 2020  1948 EKG with rate of 1115, Incomplete RBBB, nonspecific t wave abnormality. No stemi    (DT)  2255 I have spoken with the pt concerning his test results. Pt states he is having some pain at this time therefore meds ordered    (DT)    ED Course User  Index  (DT) Lurdes Medrano NP    Other:   I discussed test(s) with the performing physician.                   ED Course as of Dec 09 1359   Tue Dec 08, 2020   1948 EKG with rate of 1115, Incomplete RBBB, nonspecific t wave abnormality. No stemi    [DT]   2255 I have spoken with the pt concerning his test results. Pt states he is having some pain at this time therefore meds ordered    [DT]      ED Course User Index  [DT] Lurdes Medrano NP        Report was given to Dr Donahue concerning the + troponin and care transitioned at 2300. Morphine, nitro, aspirin ordered.     Clinical Impression:       ICD-10-CM ICD-9-CM   1. Special screening examination for unspecified viral disease  Z11.59 V73.99   2. Chest pain  R07.9 786.50   3. NSTEMI (non-ST elevated myocardial infarction)  I21.4 410.70   4. Pulmonary embolism, unspecified chronicity, unspecified pulmonary embolism type, unspecified whether acute cor pulmonale present  I26.99 415.19   5. Pulmonary embolus  I26.99 415.19   6. BMI 37.0-37.9, adult  Z68.37 V85.37   7. Mixed hyperlipidemia  E78.2 272.2                          ED Disposition Condition    Observation                             Lurdes Medrano NP  12/08/20 2332       Lurdes Medrano NP  12/09/20 1400

## 2020-12-09 NOTE — SUBJECTIVE & OBJECTIVE
Past Medical History:   Diagnosis Date    Diabetes mellitus     Hypercholesteremia     Hypertension        No past surgical history on file.    Review of patient's allergies indicates:  No Known Allergies    No current facility-administered medications on file prior to encounter.      Current Outpatient Medications on File Prior to Encounter   Medication Sig    blood pressure monitor (BLOOD PRESSURE KIT) Kit 1 each by Misc.(Non-Drug; Combo Route) route once daily.    losartan (COZAAR) 50 MG tablet Take 50 mg by mouth once daily.    atorvastatin (LIPITOR) 40 MG tablet Take 1 tablet (40 mg total) by mouth once daily.    chlorthalidone (HYGROTEN) 25 MG Tab Take 1 tablet (25 mg total) by mouth once daily.    metFORMIN (GLUCOPHAGE) 500 MG tablet Take 1 tablet (500 mg total) by mouth daily with breakfast.     Family History     Problem Relation (Age of Onset)    Diabetes Brother, Daughter, Maternal Grandmother, Paternal Grandfather    Hypertension Mother, Father        Tobacco Use    Smoking status: Current Every Day Smoker     Packs/day: 0.50     Years: 9.00     Pack years: 4.50     Types: Cigarettes    Smokeless tobacco: Never Used    Tobacco comment: cigars   Substance and Sexual Activity    Alcohol use: Yes     Comment: occasional    Drug use: No    Sexual activity: Not on file     Review of Systems   Constitutional: Negative for chills and fever.   Eyes: Negative for photophobia.   Respiratory: Positive for shortness of breath. Negative for cough, chest tightness and wheezing.    Cardiovascular: Positive for chest pain. Negative for palpitations and leg swelling.   Gastrointestinal: Negative for abdominal pain, diarrhea, nausea and vomiting.   Genitourinary: Negative for dysuria, flank pain and hematuria.   Musculoskeletal: Negative for back pain, myalgias and neck pain.   Skin: Negative for rash and wound.   Neurological: Negative for dizziness, syncope and headaches.   Psychiatric/Behavioral: Negative  for agitation.     Objective:     Vital Signs (Most Recent):  Temp: 98.6 °F (37 °C) (12/08/20 1938)  Pulse: (!) 111 (12/09/20 0205)  Resp: 18 (12/09/20 0237)  BP: 138/69 (12/09/20 0205)  SpO2: 95 % (12/09/20 0205) Vital Signs (24h Range):  Temp:  [98.6 °F (37 °C)] 98.6 °F (37 °C)  Pulse:  [103-117] 111  Resp:  [16-19] 18  SpO2:  [93 %-97 %] 95 %  BP: (135-176)/() 138/69     Weight: (!) 145.2 kg (320 lb 1.7 oz)  Body mass index is 42.23 kg/m².    Physical Exam  Constitutional:       General: He is not in acute distress.     Appearance: He is well-developed. He is obese.   HENT:      Head: Normocephalic and atraumatic.   Eyes:      Conjunctiva/sclera: Conjunctivae normal.      Pupils: Pupils are equal, round, and reactive to light.   Neck:      Musculoskeletal: Normal range of motion and neck supple.      Vascular: No JVD.   Cardiovascular:      Rate and Rhythm: Regular rhythm. Tachycardia present.      Heart sounds: Normal heart sounds.   Pulmonary:      Effort: Pulmonary effort is normal. No respiratory distress.      Breath sounds: Normal breath sounds. No wheezing.   Abdominal:      General: Bowel sounds are normal. There is no distension.      Palpations: Abdomen is soft.      Tenderness: There is no abdominal tenderness. There is no guarding.   Musculoskeletal: Normal range of motion.         General: No tenderness.   Skin:     General: Skin is warm and dry.      Capillary Refill: Capillary refill takes less than 2 seconds.      Findings: No erythema.   Neurological:      Mental Status: He is alert and oriented to person, place, and time.   Psychiatric:         Behavior: Behavior normal.           CRANIAL NERVES     CN III, IV, VI   Pupils are equal, round, and reactive to light.       Significant Labs:   CBC:   Recent Labs   Lab 12/08/20 2102   WBC 11.69   HGB 14.8   HCT 44.2        CMP:   Recent Labs   Lab 12/08/20 2102      K 3.7      CO2 20*   *   BUN 13   CREATININE 1.2    CALCIUM 9.2   PROT 8.4   ALBUMIN 3.7   BILITOT 0.4   ALKPHOS 87   AST 21   ALT 22   ANIONGAP 16   EGFRNONAA >60       Significant Imaging: I have reviewed all pertinent imaging results/findings within the past 24 hours.

## 2020-12-09 NOTE — NURSING
Dr. Herron called to report critical finding of bilateral PE. Notified Dr. Maki at 0734. She verified he was on Heparin drip at 12.7. Patient is in no pain at present.

## 2020-12-09 NOTE — ED NOTES
Patient stated he was also having some right upper medial leg pain that has been persistent for a couple of weeks. Was seen in ER a month ago and had an US done to rule out blood clots. Let Lurdes NP know.

## 2020-12-09 NOTE — PLAN OF CARE
Report given to RICH Mondragon.  Pt is resting in bed. No c/o pain at this time. VSS.  Neuro is intact. Left groin gauze/tegaderm CDI. No bleeding or hematoma noted. +2 rachel radial and pedal pulses.

## 2020-12-09 NOTE — EKG INTERPRETATIONS - EMERGENCY DEPT.
Time of study: 12/08/2020 19:48    Independent interpretation by ED physician.    Sinus tachycardia.  Ventricular rate 115 beats per minute.  Normal axis.  Incomplete right bundle-branch block.  No ST segment elevation or depression.  Nonspecific inferior and anterolateral T-wave changes.

## 2020-12-09 NOTE — ED TRIAGE NOTES
Review of patient's allergies indicates:  No Known Allergies     Patient has verified the spelling of their name and  on armband.   APPEARANCE: Patient is alert, calm, oriented x 4, and does not appear distressed. +headache  SKIN: Skin is normal for race, warm, and dry. Normal skin turgor and mucous membranes moist.  CARDIAC: Normal rate and rhythm, no murmur heard. Mid sternal chest pain +hypertensive but pt states he has not taken BP medication today  RESPIRATORY:Normal rate and effort. Breath sounds clear bilaterally throughout chest. Respirations are equal and unlabored.  +cough  GASTRO: Bowel sounds normal, abdomen is soft, no tenderness, and no abdominal distention.  MUSCLE: Full ROM. No bony tenderness or soft tissue tenderness. No obvious deformity. +body aches  : Voids without complication

## 2020-12-09 NOTE — H&P
[facility] Hospital Medicine  History & Physical    Patient Name: Beth Damian Jr.  MRN: [id number]  Admission Date: 12/8/2020  Attending Physician: Ciera Maki MD   Primary Care Provider: [facility]         Patient information was obtained from patient, past medical records and ER records.     Subjective:     Principal Problem:NSTEMI (non-ST elevated myocardial infarction)    Chief Complaint:   Chief Complaint   Patient presents with    COVID-19 Concerns     Patient states wife was tested positive for Covid on Saturday. He started with midsternal chest pain, cough, headaches, dizziness, body aches today. Denies fever.         HPI: Beth Damian is a 34 yo male with a pmh of HTN, HLD, tobacco abuse and DM who presented to ED with complaint of chest pain. Pt reports onset of constant non-radiating retrosternal chest pressure/throbbing early am on 12/8 associated with SOB. He denies n/v, diaphoresis and syncope. No lower extremity edema, cough or congestion. No similar events in the past . No fever/chills. Pt reports family hx of CAD. His wife and child recently tested positive for COVID 19.     Pt tachycardic and hypertensive on arrival to ED. Labs remarkable for troponin 1.103. EKG shows tachycardic with incomplete right bundle-branch block.  No ST segment elevation or depression.  Nonspecific inferior and anterolateral T-wave changes.  He received morphine and zofran. Pt states chest pain resolved on my exam. He reports compliance with home antihypertensive. Heparin gtt initiated per ED. Patient admitted to [facility].     Past Medical History:   Diagnosis Date    Diabetes mellitus     Hypercholesteremia     Hypertension        No past surgical history on file.    Review of patient's allergies indicates:  No Known Allergies    No current facility-administered medications on file prior to encounter.      Current Outpatient Medications on File Prior  to Encounter   Medication Sig    blood pressure monitor (BLOOD PRESSURE KIT) Kit 1 each by Misc.(Non-Drug; Combo Route) route once daily.    losartan (COZAAR) 50 MG tablet Take 50 mg by mouth once daily.    atorvastatin (LIPITOR) 40 MG tablet Take 1 tablet (40 mg total) by mouth once daily.    chlorthalidone (HYGROTEN) 25 MG Tab Take 1 tablet (25 mg total) by mouth once daily.    metFORMIN (GLUCOPHAGE) 500 MG tablet Take 1 tablet (500 mg total) by mouth daily with breakfast.     Family History     Problem Relation (Age of Onset)    Diabetes Brother, Daughter, Maternal Grandmother, Paternal Grandfather    Hypertension Mother, Father        Tobacco Use    Smoking status: Current Every Day Smoker     Packs/day: 0.50     Years: 9.00     Pack years: 4.50     Types: Cigarettes    Smokeless tobacco: Never Used    Tobacco comment: cigars   Substance and Sexual Activity    Alcohol use: Yes     Comment: occasional    Drug use: No    Sexual activity: Not on file     Review of Systems   Constitutional: Negative for chills and fever.   Eyes: Negative for photophobia.   Respiratory: Positive for shortness of breath. Negative for cough, chest tightness and wheezing.    Cardiovascular: Positive for chest pain. Negative for palpitations and leg swelling.   Gastrointestinal: Negative for abdominal pain, diarrhea, nausea and vomiting.   Genitourinary: Negative for dysuria, flank pain and hematuria.   Musculoskeletal: Negative for back pain, myalgias and neck pain.   Skin: Negative for rash and wound.   Neurological: Negative for dizziness, syncope and headaches.   Psychiatric/Behavioral: Negative for agitation.     Objective:     Vital Signs (Most Recent):  Temp: 98.6 °F (37 °C) (12/08/20 1938)  Pulse: (!) 111 (12/09/20 0205)  Resp: 18 (12/09/20 0237)  BP: 138/69 (12/09/20 0205)  SpO2: 95 % (12/09/20 0205) Vital Signs (24h Range):  Temp:  [98.6 °F (37 °C)] 98.6 °F (37 °C)  Pulse:  [103-117] 111  Resp:  [16-19] 18  SpO2:   [93 %-97 %] 95 %  BP: (135-176)/() 138/69     Weight: (!) 145.2 kg (320 lb 1.7 oz)  Body mass index is 42.23 kg/m².    Physical Exam  Constitutional:       General: He is not in acute distress.     Appearance: He is well-developed. He is obese.   HENT:      Head: Normocephalic and atraumatic.   Eyes:      Conjunctiva/sclera: Conjunctivae normal.      Pupils: Pupils are equal, round, and reactive to light.   Neck:      Musculoskeletal: Normal range of motion and neck supple.      Vascular: No JVD.   Cardiovascular:      Rate and Rhythm: Regular rhythm. Tachycardia present.      Heart sounds: Normal heart sounds.   Pulmonary:      Effort: Pulmonary effort is normal. No respiratory distress.      Breath sounds: Normal breath sounds. No wheezing.   Abdominal:      General: Bowel sounds are normal. There is no distension.      Palpations: Abdomen is soft.      Tenderness: There is no abdominal tenderness. There is no guarding.   Musculoskeletal: Normal range of motion.         General: No tenderness.   Skin:     General: Skin is warm and dry.      Capillary Refill: Capillary refill takes less than 2 seconds.      Findings: No erythema.   Neurological:      Mental Status: He is alert and oriented to person, place, and time.   Psychiatric:         Behavior: Behavior normal.           CRANIAL NERVES     CN III, IV, VI   Pupils are equal, round, and reactive to light.       Significant Labs:   CBC:   Recent Labs   Lab 12/08/20  2102   WBC 11.69   HGB 14.8   HCT 44.2        CMP:   Recent Labs   Lab 12/08/20  2102      K 3.7      CO2 20*   *   BUN 13   CREATININE 1.2   CALCIUM 9.2   PROT 8.4   ALBUMIN 3.7   BILITOT 0.4   ALKPHOS 87   AST 21   ALT 22   ANIONGAP 16   EGFRNONAA >60       Significant Imaging: I have reviewed all pertinent imaging results/findings within the past 24 hours.    Assessment/Plan:     * NSTEMI (non-ST elevated myocardial infarction)  34 yo male presents to ED with 10/10  "retrosternal chest pain described as constant "throbbing, pressure," associated with SOB x 1 day. Initial troponin 1.103. EKG shows tachycardic with incomplete right bundle-branch block.  No ST segment elevation or depression.  Nonspecific inferior and anterolateral T-wave changes. Received morphine, zofran and 325 mg ASA. Heparin infusing. Denies active chest pain on exam. HEART score 4.     -trend cardiac enzymes   -monitor telemetry   -check lipid panel, A1C   -TTE   -continue Heparin gtt   -consult cardiology     Mixed hyperlipidemia  Continue atorvastatin     Prediabetes  A1C pending  SSI, accucheck AC&HS, Diabetic diet       Essential hypertension  Continue losartan 50 mg daily. No longer taking chlorthalidone         VTE Risk Mitigation (From admission, onward)         Ordered     heparin 25,000 units in dextrose 5% (100 units/ml) IV bolus from bag - ADDITIONAL PRN BOLUS - 60 units/kg (max bolus 4000 units)  As needed (PRN)     Question:  Heparin Infusion Adjustment (DO NOT MODIFY ANSWER)  Answer:  \\[email or web address]\epic\Images\Pharmacy\HeparinInfusions\heparin LOW INTENSITY nomogram for [facility] [id number].pdf    12/09/20 0114     heparin 25,000 units in dextrose 5% (100 units/ml) IV bolus from bag - ADDITIONAL PRN BOLUS - 30 units/kg (max bolus 4000 units)  As needed (PRN)     Question:  Heparin Infusion Adjustment (DO NOT MODIFY ANSWER)  Answer:  \\[email or web address]\epic\Images\Pharmacy\HeparinInfusions\heparin LOW INTENSITY nomogram for [facility] [id number].pdf    12/09/20 0114     IP VTE HIGH RISK PATIENT  Once      12/09/20 0208     Place sequential compression device  Until discontinued      12/09/20 0208     heparin 25,000 units in dextrose 5% 250 mL (100 units/mL) infusion LOW INTENSITY nomogram - [facility]  Continuous     Question Answer Comment   Heparin Infusion Adjustment (DO NOT MODIFY ANSWER) [email or web address]\epic\Images\Pharmacy\HeparinInfusions\heparin LOW INTENSITY nomogram for [facility] [id number].pdf    Begin at (in units/kg/hr) 12     "    12/09/20 0114              Critical care time spent on the evaluation and treatment of severe organ dysfunction, review of pertinent labs and imaging studies, discussions with consulting providers and discussions with patient/family: 60 minutes.     Kenna Salamanca NP  Department of Hospital Medicine   [facility]

## 2020-12-09 NOTE — PROGRESS NOTES
I evaluated this patient had a face-to-face encounter.  I personally obtained history from the patient and performed a physical examination.  The patient's wife and son both have COVID-19.  They are having fever and body aches.  The patient became very concerned that he might also have the virus.  This caused him to worry.  He presented to the ED for a COVID-19 test; however, in the hours prior to coming to the ED he was having severe retrosternal chest pain.  He describes as a throbbing sensation.  He had associated shortness of breath.  This lasted for several hours and resolved while in the ED. He has a negative COVID test; however, his blood pressure was very elevated on arrival and he was tachycardic.  He also has an elevated troponin.  On my exam, the patient is in no distress.  He is tachycardic.  Heart sounds otherwise normal.  He has no signs of respiratory distress and clear breath sounds.  He has a history of hypertension and is on blood pressure medication.  He reports that he is prediabetic but takes metformin.  He smokes close to a pack of black and milds daily.  He has been persistently tachycardic while in the ED. D-dimer is pending.  If positive, CTA of the chest will be needed to rule out PE.  He regardless, the patient will be started on a heparin infusion and admitted.

## 2020-12-09 NOTE — CONSULTS
[facility]  Cardiology  Consult Note    Patient Name: Beth Damian Jr.  MRN: [id number]  Admission Date: 12/8/2020  Hospital Length of Stay: 0 days  Code Status: Full Code   Attending Provider: Ciera Maki MD   Consulting Provider: Everardo Whiting NP  Primary Care Physician: [facility]  Principal Problem:NSTEMI (non-ST elevated myocardial infarction)    Patient information was obtained from patient, past medical records and ER records.     Inpatient consult to [facility]  Consult performed by: Everardo Whiting NP  Consult ordered by: Kenna Salamanca NP        Subjective:     Chief Complaint:  CP, SOB     HPI:   Beth Damian is a 36 yo male with a pmh of HTN, HLD, tobacco abuse and DM who presented to ED with complaint of chest pain. Pt reports onset of constant non-radiating retrosternal chest pressure/throbbing early am on 12/8 associated with SOB. He denies n/v, diaphoresis and syncope. No lower extremity edema, cough or congestion. No similar events in the past . No fever/chills. Pt reports family hx of CAD. His wife and child recently tested positive for COVID 19. Patient tachycardic and hypertensive on arrival to ED. Troponin up to 1.2, D dimer 17.  EKG shows tachycardic with incomplete right bundle-branch block.  No ST segment elevation or depression. Nonspecific inferior and anterolateral T-wave changes.    CTA chest:  Patient has a significant pulmonary embolism in which there are sagittal like distributions of embolic material involving the bifurcation of the right left main pulmonary arteries.  There are portions of the 1st order branches which are occluded by approximately 90%.   There appears to be enlargement of the cardiac silhouette in general and there is also prominence of the right ventricle.  US of BLE pending  Patient NPO for thrombectomy   He is on a Heparin gtt.     Past Medical History:   Diagnosis Date    Diabetes mellitus      Hypercholesteremia     Hypertension        No past surgical history on file.    Review of patient's allergies indicates:  No Known Allergies    No current facility-administered medications on file prior to encounter.      Current Outpatient Medications on File Prior to Encounter   Medication Sig    blood pressure monitor (BLOOD PRESSURE KIT) Kit 1 each by Misc.(Non-Drug; Combo Route) route once daily.    losartan (COZAAR) 50 MG tablet Take 50 mg by mouth once daily.    atorvastatin (LIPITOR) 40 MG tablet Take 1 tablet (40 mg total) by mouth once daily.    chlorthalidone (HYGROTEN) 25 MG Tab Take 1 tablet (25 mg total) by mouth once daily.    metFORMIN (GLUCOPHAGE) 500 MG tablet Take 1 tablet (500 mg total) by mouth daily with breakfast.     Family History     Problem Relation (Age of Onset)    Diabetes Brother, Daughter, Maternal Grandmother, Paternal Grandfather    Hypertension Mother, Father        Tobacco Use    Smoking status: Current Every Day Smoker     Packs/day: 0.50     Years: 9.00     Pack years: 4.50     Types: Cigarettes    Smokeless tobacco: Never Used    Tobacco comment: cigars   Substance and Sexual Activity    Alcohol use: Yes     Comment: occasional    Drug use: No    Sexual activity: Not on file     Review of Systems   Constitution: Positive for fever and malaise/fatigue.   HENT: Negative.    Eyes: Negative.    Cardiovascular: Positive for chest pain and dyspnea on exertion. Negative for leg swelling, near-syncope, orthopnea, palpitations, paroxysmal nocturnal dyspnea and syncope.   Respiratory: Positive for cough and shortness of breath.    Endocrine: Negative.    Hematologic/Lymphatic: Negative for bleeding problem. Does not bruise/bleed easily.   Skin: Negative.    Musculoskeletal: Negative.    Gastrointestinal: Negative.  Negative for hematemesis, hematochezia, melena, nausea and vomiting.   Genitourinary: Negative.    Neurological: Positive for weakness.    Psychiatric/Behavioral: Negative.    Allergic/Immunologic: Negative.      Objective:     Vital Signs (Most Recent):  Temp: 98.1 °F (36.7 °C) (12/09/20 0800)  Pulse: 90 (12/09/20 0924)  Resp: 16 (12/09/20 0924)  BP: (!) 157/84 (12/09/20 0800)  SpO2: 97 % (12/09/20 0924) Vital Signs (24h Range):  Temp:  [98.1 °F (36.7 °C)-98.6 °F (37 °C)] 98.1 °F (36.7 °C)  Pulse:  [] 90  Resp:  [16-19] 16  SpO2:  [93 %-97 %] 97 %  BP: (119-176)/() 157/84     Weight: (!) 146.1 kg (322 lb)  Body mass index is 42.48 kg/m².    SpO2: 97 %  O2 Device (Oxygen Therapy): nasal cannula      Intake/Output Summary (Last 24 hours) at 12/9/2020 0958  Last data filed at 12/9/2020 0651  Gross per 24 hour   Intake 1000 ml   Output 650 ml   Net 350 ml       Lines/Drains/Airways     Peripheral Intravenous Line                 Peripheral IV - Single Lumen 12/08/20 2311 18 G Right Antecubital less than 1 day                Physical Exam   Constitutional: He is oriented to person, place, and time. He appears well-developed and well-nourished. No distress.   HENT:   Head: Normocephalic and atraumatic.   Eyes: Right eye exhibits no discharge. Left eye exhibits no discharge.   Neck: No JVD present.   Cardiovascular:  No extrasystoles are present. Tachycardia present.   No murmur heard.  Pulmonary/Chest: Effort normal and breath sounds normal. He has no wheezes. He has no rales.   Abdominal: Soft. Bowel sounds are normal.   Musculoskeletal:         General: No edema.   Neurological: He is alert and oriented to person, place, and time.   Skin: Skin is warm and dry. He is not diaphoretic.   Psychiatric: He has a normal mood and affect. His behavior is normal. Judgment and thought content normal.       Significant Labs:   BMP:   Recent Labs   Lab 12/08/20  2102   *      K 3.7      CO2 20*   BUN 13   CREATININE 1.2   CALCIUM 9.2   , CMP   Recent Labs   Lab 12/08/20  2102      K 3.7      CO2 20*   *   BUN 13    CREATININE 1.2   CALCIUM 9.2   PROT 8.4   ALBUMIN 3.7   BILITOT 0.4   ALKPHOS 87   AST 21   ALT 22   ANIONGAP 16   ESTGFRAFRICA >60   EGFRNONAA >60   , CBC   Recent Labs   Lab 12/08/20 2102 12/09/20  0543   WBC 11.69 11.40   HGB 14.8 13.2*   HCT 44.2 39.9*    280   , INR   Recent Labs   Lab 12/09/20  0204   INR 1.1   , Lipid Panel   Recent Labs   Lab 12/08/20 2102   CHOL 193   HDL 28*   LDLCALC 131.6   TRIG 167*   CHOLHDL 14.5*   , Troponin   Recent Labs   Lab 12/08/20 2102 12/09/20  0204 12/09/20  0727   TROPONINI 1.103* 1.233* 0.746*    and All pertinent lab results from the last 24 hours have been reviewed.    Significant Imaging: Echocardiogram:   Transthoracic echo (TTE) complete (Cupid Only):   Results for orders placed or performed during the hospital encounter of 12/08/20   Echo Color Flow Doppler? Yes   Result Value Ref Range    IVS 0.76 0.6 - 1.1 cm    LA size 3.15 cm    LVIDd 4.37 3.5 - 6.0 cm    LVIDs 3.45 2.1 - 4.0 cm    Posterior Wall 0.90 0.6 - 1.1 cm    RVDD 3.28 cm    TAPSE 1.95 cm    TR Max Alphonso 3.33 m/s    LV Diastolic Volume 86.06 mL    LV Systolic Volume 48.98 mL    FS 21 %    LV mass 113.59 g    Left Ventricle Relative Wall Thickness 0.41 cm    LV Systolic Volume Index 18.6 mL/m2    LV Diastolic Volume Index 32.67 mL/m2    LV Mass Index 43 g/m2    Triscuspid Valve Regurgitation Peak Gradient 44 mmHg    BSA 2.74 m2     Assessment and Plan:     * NSTEMI (non-ST elevated myocardial infarction)  Demand in setting of PE    Pulmonary embolus  CTA:  Pulmonary embolism in which there are sagittal like distributions of embolic material involving the bifurcation of the right left main pulmonary arteries.  There are portions of the 1st order branches which are occluded by approximately 90%.   There appears to be enlargement of the cardiac silhouette in general and there is also prominence of the right ventricle.  TTE pending  US of BLE pending   Troponin elevated and peaked at 1.2  BNP normal    D Dimer 17  Continue heparin gtt  NPO for thrombectomy   Will start DOAC prior to discharge     BMI 37.0-37.9, adult  Weight loss encouraged     Mixed hyperlipidemia  Continue Statin     Essential hypertension  SBP 110s-130s  Continue home Losartan         VTE Risk Mitigation (From admission, onward)         Ordered     heparin 25,000 units in dextrose 5% (100 units/ml) IV bolus from bag - ADDITIONAL PRN BOLUS - 60 units/kg (max bolus 4000 units)  As needed (PRN)     Question:  Heparin Infusion Adjustment (DO NOT MODIFY ANSWER)  Answer:  \\[email or web address]\epic\Images\Pharmacy\HeparinInfusions\heparin LOW INTENSITY nomogram for [facility] [id number].pdf    12/09/20 0114     heparin 25,000 units in dextrose 5% (100 units/ml) IV bolus from bag - ADDITIONAL PRN BOLUS - 30 units/kg (max bolus 4000 units)  As needed (PRN)     Question:  Heparin Infusion Adjustment (DO NOT MODIFY ANSWER)  Answer:  \\[email or web address]\epic\Images\Pharmacy\HeparinInfusions\heparin LOW INTENSITY nomogram for [facility] [id number].pdf    12/09/20 0114     IP VTE HIGH RISK PATIENT  Once      12/09/20 0208     Place sequential compression device  Until discontinued      12/09/20 0208     heparin 25,000 units in dextrose 5% 250 mL (100 units/mL) infusion LOW INTENSITY nomogram - [facility]  Continuous     Question Answer Comment   Heparin Infusion Adjustment (DO NOT MODIFY ANSWER) \\[email or web address]\epic\Images\Pharmacy\HeparinInfusions\heparin LOW INTENSITY nomogram for [facility] [id number].pdf    Begin at (in units/kg/hr) 12        12/09/20 0114                Thank you for your consult. I will follow-up with patient. Please contact us if you have any additional questions.    Everardo Whiting, DARLENE  Cardiology   [facility]

## 2020-12-09 NOTE — SUBJECTIVE & OBJECTIVE
Past Medical History:   Diagnosis Date    Diabetes mellitus     Hypercholesteremia     Hypertension        No past surgical history on file.    Review of patient's allergies indicates:  No Known Allergies    No current facility-administered medications on file prior to encounter.      Current Outpatient Medications on File Prior to Encounter   Medication Sig    blood pressure monitor (BLOOD PRESSURE KIT) Kit 1 each by Misc.(Non-Drug; Combo Route) route once daily.    losartan (COZAAR) 50 MG tablet Take 50 mg by mouth once daily.    atorvastatin (LIPITOR) 40 MG tablet Take 1 tablet (40 mg total) by mouth once daily.    chlorthalidone (HYGROTEN) 25 MG Tab Take 1 tablet (25 mg total) by mouth once daily.    metFORMIN (GLUCOPHAGE) 500 MG tablet Take 1 tablet (500 mg total) by mouth daily with breakfast.     Family History     Problem Relation (Age of Onset)    Diabetes Brother, Daughter, Maternal Grandmother, Paternal Grandfather    Hypertension Mother, Father        Tobacco Use    Smoking status: Current Every Day Smoker     Packs/day: 0.50     Years: 9.00     Pack years: 4.50     Types: Cigarettes    Smokeless tobacco: Never Used    Tobacco comment: cigars   Substance and Sexual Activity    Alcohol use: Yes     Comment: occasional    Drug use: No    Sexual activity: Not on file     Review of Systems   Constitution: Positive for fever and malaise/fatigue.   HENT: Negative.    Eyes: Negative.    Cardiovascular: Positive for chest pain and dyspnea on exertion. Negative for leg swelling, near-syncope, orthopnea, palpitations, paroxysmal nocturnal dyspnea and syncope.   Respiratory: Positive for cough and shortness of breath.    Endocrine: Negative.    Hematologic/Lymphatic: Negative for bleeding problem. Does not bruise/bleed easily.   Skin: Negative.    Musculoskeletal: Negative.    Gastrointestinal: Negative.  Negative for hematemesis, hematochezia, melena, nausea and vomiting.   Genitourinary: Negative.     Neurological: Positive for weakness.   Psychiatric/Behavioral: Negative.    Allergic/Immunologic: Negative.      Objective:     Vital Signs (Most Recent):  Temp: 98.1 °F (36.7 °C) (12/09/20 0800)  Pulse: 90 (12/09/20 0924)  Resp: 16 (12/09/20 0924)  BP: (!) 157/84 (12/09/20 0800)  SpO2: 97 % (12/09/20 0924) Vital Signs (24h Range):  Temp:  [98.1 °F (36.7 °C)-98.6 °F (37 °C)] 98.1 °F (36.7 °C)  Pulse:  [] 90  Resp:  [16-19] 16  SpO2:  [93 %-97 %] 97 %  BP: (119-176)/() 157/84     Weight: (!) 146.1 kg (322 lb)  Body mass index is 42.48 kg/m².    SpO2: 97 %  O2 Device (Oxygen Therapy): nasal cannula      Intake/Output Summary (Last 24 hours) at 12/9/2020 0958  Last data filed at 12/9/2020 0651  Gross per 24 hour   Intake 1000 ml   Output 650 ml   Net 350 ml       Lines/Drains/Airways     Peripheral Intravenous Line                 Peripheral IV - Single Lumen 12/08/20 2311 18 G Right Antecubital less than 1 day                Physical Exam   Constitutional: He is oriented to person, place, and time. He appears well-developed and well-nourished. No distress.   HENT:   Head: Normocephalic and atraumatic.   Eyes: Right eye exhibits no discharge. Left eye exhibits no discharge.   Neck: No JVD present.   Cardiovascular:  No extrasystoles are present. Tachycardia present.   No murmur heard.  Pulmonary/Chest: Effort normal and breath sounds normal. He has no wheezes. He has no rales.   Abdominal: Soft. Bowel sounds are normal.   Musculoskeletal:         General: No edema.   Neurological: He is alert and oriented to person, place, and time.   Skin: Skin is warm and dry. He is not diaphoretic.   Psychiatric: He has a normal mood and affect. His behavior is normal. Judgment and thought content normal.       Significant Labs:   BMP:   Recent Labs   Lab 12/08/20 2102   *      K 3.7      CO2 20*   BUN 13   CREATININE 1.2   CALCIUM 9.2   , CMP   Recent Labs   Lab 12/08/20  2102      K 3.7   CL  105   CO2 20*   *   BUN 13   CREATININE 1.2   CALCIUM 9.2   PROT 8.4   ALBUMIN 3.7   BILITOT 0.4   ALKPHOS 87   AST 21   ALT 22   ANIONGAP 16   ESTGFRAFRICA >60   EGFRNONAA >60   , CBC   Recent Labs   Lab 12/08/20 2102 12/09/20  0543   WBC 11.69 11.40   HGB 14.8 13.2*   HCT 44.2 39.9*    280   , INR   Recent Labs   Lab 12/09/20  0204   INR 1.1   , Lipid Panel   Recent Labs   Lab 12/08/20  2102   CHOL 193   HDL 28*   LDLCALC 131.6   TRIG 167*   CHOLHDL 14.5*   , Troponin   Recent Labs   Lab 12/08/20 2102 12/09/20  0204 12/09/20  0727   TROPONINI 1.103* 1.233* 0.746*    and All pertinent lab results from the last 24 hours have been reviewed.    Significant Imaging: Echocardiogram:   Transthoracic echo (TTE) complete (Cupid Only):   Results for orders placed or performed during the hospital encounter of 12/08/20   Echo Color Flow Doppler? Yes   Result Value Ref Range    IVS 0.76 0.6 - 1.1 cm    LA size 3.15 cm    LVIDd 4.37 3.5 - 6.0 cm    LVIDs 3.45 2.1 - 4.0 cm    Posterior Wall 0.90 0.6 - 1.1 cm    RVDD 3.28 cm    TAPSE 1.95 cm    TR Max Alphonso 3.33 m/s    LV Diastolic Volume 86.06 mL    LV Systolic Volume 48.98 mL    FS 21 %    LV mass 113.59 g    Left Ventricle Relative Wall Thickness 0.41 cm    LV Systolic Volume Index 18.6 mL/m2    LV Diastolic Volume Index 32.67 mL/m2    LV Mass Index 43 g/m2    Triscuspid Valve Regurgitation Peak Gradient 44 mmHg    BSA 2.74 m2

## 2020-12-09 NOTE — PROGRESS NOTES
Discussed the patient's presentation, exam, workup, and ED management with Kenna Salamanca, who is the [facility]ER on call for [facility], and she agrees to admit the patient.

## 2020-12-09 NOTE — HPI
Beth Damian is a 34 yo male with a pmh of HTN, HLD, tobacco abuse and DM who presented to ED with complaint of chest pain. Pt reports onset of constant non-radiating retrosternal chest pressure/throbbing early am on 12/8 associated with SOB. He denies n/v, diaphoresis and syncope. No lower extremity edema, cough or congestion. No similar events in the past . No fever/chills. Pt reports family hx of CAD. His wife and child recently tested positive for COVID 19. Patient tachycardic and hypertensive on arrival to ED. Troponin up to 1.2, D dimer 17.  EKG shows tachycardic with incomplete right bundle-branch block.  No ST segment elevation or depression. Nonspecific inferior and anterolateral T-wave changes.    CTA chest:  Patient has a significant pulmonary embolism in which there are sagittal like distributions of embolic material involving the bifurcation of the right left main pulmonary arteries.  There are portions of the 1st order branches which are occluded by approximately 90%.   There appears to be enlargement of the cardiac silhouette in general and there is also prominence of the right ventricle.  US of BLE pending  Patient NPO for thrombectomy   He is on a Heparin gtt.

## 2020-12-09 NOTE — ASSESSMENT & PLAN NOTE
"36 yo male presents to ED with 10/10 retrosternal chest pain described as constant "throbbing, pressure," associated with SOB x 1 day. Initial troponin 1.103. EKG shows tachycardic with incomplete right bundle-branch block.  No ST segment elevation or depression.  Nonspecific inferior and anterolateral T-wave changes. Received morphine, zofran and 325 mg ASA. Heparin infusing. Denies active chest pain on exam. HEART score 4.     -trend cardiac enzymes   -monitor telemetry   -check lipid panel, A1C   -TTE   -continue Heparin gtt   -consult cardiology   "

## 2020-12-09 NOTE — PLAN OF CARE
Patient remaining in room 254 to recover. side rails up x2 .  Pt drowsy but able to follow commands. Pt is stable when connecting to cardiac monitors.  VSS. Left groin gauze/tegaderm CDI. No bleeding or hematoma noted.  Symmetrical in size to BLE.  +2 pulses. Neuro is intact.  No c/o pain. Skin normal in color and warm to touch, < 3 sec cap refill.  Fall risk precautions given and patient acknowledges.  AIDET completed to pt.  Will continue to monitor patient.

## 2020-12-09 NOTE — PROCEDURES
": Elan Silvestre MD  Pre-procedure diagnosis: SUbmassive PE  Post-procedure diagnosis: PE    Post Procedure Note: Mechanical thrombectomy of PA's    The pt was brought to the cath lab and under sterile technique, LCFV access was obtained without difficulty. Images were obtained in multiple views and severe bilateral PE's noted. Successful aspiration thrombectomy with Inari device with excellent results. Please see full report for details. The pt tolerated the procedure well without complications. Perclose closure device and manual pressure held with successful hemostasis.     Vitals:    12/09/20 0919 12/09/20 0924 12/09/20 1100 12/09/20 1200   BP:    133/62   Patient Position:    Lying   Pulse:  90 95    Resp:  16 (!) 21    Temp:    97.7 °F (36.5 °C)   TempSrc:    Oral   SpO2:  97%     Weight: (!) 146.1 kg (322 lb)      Height: 6' 1" (1.854 m)            Gen: NAD  Ext: 2+ fem pulse, no evidence of hematoma  Estimated blood loss: < 50 cc    Plan:  -Post cath care per protocol  -Staged intervention of RLE for DVT tomorrow  -Resume heparin drip at previous rate with no bolus in 4 hours      "

## 2020-12-09 NOTE — ASSESSMENT & PLAN NOTE
CTA:  Pulmonary embolism in which there are sagittal like distributions of embolic material involving the bifurcation of the right left main pulmonary arteries.  There are portions of the 1st order branches which are occluded by approximately 90%.   There appears to be enlargement of the cardiac silhouette in general and there is also prominence of the right ventricle.  TTE pending  US of BLE pending   Troponin elevated and peaked at 1.2  BNP normal   D Dimer 17  Continue heparin gtt  NPO for thrombectomy   Will start DOAC prior to discharge

## 2020-12-09 NOTE — HPI
Beth Damian is a 36 yo male with a pmh of HTN, HLD, tobacco abuse and DM who presented to ED with complaint of chest pain. Pt reports onset of constant non-radiating retrosternal chest pressure/throbbing early am on 12/8 associated with SOB. He denies n/v, diaphoresis and syncope. No lower extremity edema, cough or congestion. No similar events in the past . No fever/chills. Pt reports family hx of CAD. His wife and child recently tested positive for COVID 19.     Pt tachycardic and hypertensive on arrival to ED. Labs remarkable for troponin 1.103. EKG shows tachycardic with incomplete right bundle-branch block.  No ST segment elevation or depression.  Nonspecific inferior and anterolateral T-wave changes.  He received morphine and zofran. Pt states chest pain resolved on my exam. He reports compliance with home antihypertensive. Heparin gtt initiated per ED. Patient admitted to [facility].

## 2020-12-09 NOTE — PLAN OF CARE
COVID-19 Concerns       Patient states wife was tested positive for Covid on Saturday. He started with midsternal chest pain, cough, headaches, dizziness, body aches today. Denies fever.       35-year-old male presents emergency room with reports of substernal chest tightness that began this morning.  Patient reports that he has been concerned over possibility of having COVID is his wife and son are positive for COVID at this time.  Patient states since learning that his COVID test is negative the sensation his ease however he still having some tightness to the substernal aspect of his chest.  He denies shortness of breath currently.  Patient has a past medical history of diabetes, hyper cholesterolemia, hypertension.  Patient is nontoxic in appearance.     The pt's currently in the overflow unit. The Sw met with the pt and his mother Nu Damian [phone] who was at bedside during the assessment. The pt's independent with his adl's and he uses a cpap at home for heidy. The pt receives all his medical care at [facility](Dr. Gibson). The pt's unemployed currently but states he can afford his meds via his Medicaid. The pt currently lives with  his parents and nephew in [place]. The pt's wife Madelyn Damian [phone] and his son are both(+)for COVID but he's (-). The Sw completed the white board in the pt's room and gave him a d/c brochure. The Sw encouraged him to call if he has any further questions. The Sw will continue to follow the pt throughout his transitions of care and will assist with any d/c needs.        12/09/20 1053   Discharge Assessment   Assessment Type Discharge Planning Assessment   Confirmed/corrected address and phone number on facesheet? Yes   Assessment information obtained from? Patient   Prior to hospitilization cognitive status: Alert/Oriented   Prior to hospitalization functional status: Independent   Current cognitive status: Alert/Oriented   Current Functional Status:  Independent   Lives With parent(s);other relative(s)   Able to Return to Prior Arrangements yes   Is patient able to care for self after discharge? Yes   Who are your caregiver(s) and their phone number(s)? Nu Damian(mother)[phone]   Patient's perception of discharge disposition home or selfcare   Readmission Within the Last 30 Days no previous admission in last 30 days   Patient currently being followed by outpatient case management? No   Patient currently receives any other outside agency services? No   Equipment Currently Used at Home none   Do you have any problems affording any of your prescribed medications? No  (the pt receives his meds affordably at [facility] in [place])   Is the patient taking medications as prescribed? yes   Does the patient have transportation home? Yes   Transportation Anticipated family or friend will provide   Does the patient receive services at the [facility]? No   Discharge Plan A Home with family   Discharge Plan B Home Health   DME Needed Upon Discharge  none   Patient/Family in Agreement with Plan yes

## 2020-12-10 LAB
ALBUMIN SERPL BCP-MCNC: 3.2 G/DL (ref 3.5–5.2)
ALP SERPL-CCNC: 74 U/L (ref 55–135)
ALT SERPL W/O P-5'-P-CCNC: 17 U/L (ref 10–44)
ANION GAP SERPL CALC-SCNC: 13 MMOL/L (ref 8–16)
APTT BLDCRRT: 31.9 SEC (ref 21–32)
APTT BLDCRRT: 34.9 SEC (ref 21–32)
APTT BLDCRRT: 44.4 SEC (ref 21–32)
AST SERPL-CCNC: 16 U/L (ref 10–40)
BASOPHILS # BLD AUTO: 0.02 K/UL (ref 0–0.2)
BASOPHILS NFR BLD: 0.1 % (ref 0–1.9)
BILIRUB SERPL-MCNC: 0.6 MG/DL (ref 0.1–1)
BUN SERPL-MCNC: 12 MG/DL (ref 6–20)
CALCIUM SERPL-MCNC: 8.6 MG/DL (ref 8.7–10.5)
CHLORIDE SERPL-SCNC: 103 MMOL/L (ref 95–110)
CO2 SERPL-SCNC: 23 MMOL/L (ref 23–29)
CREAT SERPL-MCNC: 1.2 MG/DL (ref 0.5–1.4)
DIFFERENTIAL METHOD: ABNORMAL
EOSINOPHIL # BLD AUTO: 0.1 K/UL (ref 0–0.5)
EOSINOPHIL NFR BLD: 0.4 % (ref 0–8)
ERYTHROCYTE [DISTWIDTH] IN BLOOD BY AUTOMATED COUNT: 13.2 % (ref 11.5–14.5)
EST. GFR  (AFRICAN AMERICAN): >60 ML/MIN/1.73 M^2
EST. GFR  (NON AFRICAN AMERICAN): >60 ML/MIN/1.73 M^2
GLUCOSE SERPL-MCNC: 135 MG/DL (ref 70–110)
HCT VFR BLD AUTO: 37.2 % (ref 40–54)
HGB BLD-MCNC: 12.5 G/DL (ref 14–18)
IMM GRANULOCYTES # BLD AUTO: 0.05 K/UL (ref 0–0.04)
IMM GRANULOCYTES NFR BLD AUTO: 0.4 % (ref 0–0.5)
LYMPHOCYTES # BLD AUTO: 4.2 K/UL (ref 1–4.8)
LYMPHOCYTES NFR BLD: 30.4 % (ref 18–48)
MAGNESIUM SERPL-MCNC: 1.7 MG/DL (ref 1.6–2.6)
MCH RBC QN AUTO: 28.1 PG (ref 27–31)
MCHC RBC AUTO-ENTMCNC: 33.6 G/DL (ref 32–36)
MCV RBC AUTO: 84 FL (ref 82–98)
MONOCYTES # BLD AUTO: 0.8 K/UL (ref 0.3–1)
MONOCYTES NFR BLD: 5.5 % (ref 4–15)
NEUTROPHILS # BLD AUTO: 8.7 K/UL (ref 1.8–7.7)
NEUTROPHILS NFR BLD: 63.2 % (ref 38–73)
NRBC BLD-RTO: 0 /100 WBC
PLATELET # BLD AUTO: 258 K/UL (ref 150–350)
PMV BLD AUTO: 10.1 FL (ref 9.2–12.9)
POC ACTIVATED CLOTTING TIME K: 120 SEC (ref 74–137)
POC ACTIVATED CLOTTING TIME K: 180 SEC (ref 74–137)
POC ACTIVATED CLOTTING TIME K: 186 SEC (ref 74–137)
POC ACTIVATED CLOTTING TIME K: 191 SEC (ref 74–137)
POC ACTIVATED CLOTTING TIME K: 202 SEC (ref 74–137)
POC ACTIVATED CLOTTING TIME K: 290 SEC (ref 74–137)
POCT GLUCOSE: 103 MG/DL (ref 70–110)
POCT GLUCOSE: 117 MG/DL (ref 70–110)
POCT GLUCOSE: 118 MG/DL (ref 70–110)
POCT GLUCOSE: 156 MG/DL (ref 70–110)
POTASSIUM SERPL-SCNC: 3.6 MMOL/L (ref 3.5–5.1)
PROT SERPL-MCNC: 7.5 G/DL (ref 6–8.4)
RBC # BLD AUTO: 4.45 M/UL (ref 4.6–6.2)
SAMPLE: ABNORMAL
SAMPLE: NORMAL
SODIUM SERPL-SCNC: 139 MMOL/L (ref 136–145)
WBC # BLD AUTO: 13.74 K/UL (ref 3.9–12.7)

## 2020-12-10 PROCEDURE — 25000003 PHARM REV CODE 250: Performed by: NURSE PRACTITIONER

## 2020-12-10 PROCEDURE — 37187 VENOUS MECH THROMBECTOMY: CPT | Mod: 50,,, | Performed by: INTERNAL MEDICINE

## 2020-12-10 PROCEDURE — C1876 STENT, NON-COA/NON-COV W/DEL: HCPCS | Performed by: INTERNAL MEDICINE

## 2020-12-10 PROCEDURE — 27000190 HC CPAP FULL FACE MASK W/VALVE

## 2020-12-10 PROCEDURE — 37239 PR OPEN/PERQ TRANSCATH PLACE STENT SAME VESSEL; EA ADD'L VEIN: ICD-10-PCS | Mod: ,,, | Performed by: INTERNAL MEDICINE

## 2020-12-10 PROCEDURE — 37253 INTRVASC US NONCORONARY ADDL: CPT | Performed by: INTERNAL MEDICINE

## 2020-12-10 PROCEDURE — 36415 COLL VENOUS BLD VENIPUNCTURE: CPT

## 2020-12-10 PROCEDURE — 37187 VENOUS MECH THROMBECTOMY: CPT | Mod: 50 | Performed by: INTERNAL MEDICINE

## 2020-12-10 PROCEDURE — 37253 INTRVASC US NONCORONARY ADDL: CPT | Mod: ,,, | Performed by: INTERNAL MEDICINE

## 2020-12-10 PROCEDURE — 99152 PR MOD CONSCIOUS SEDATION, SAME PHYS, 5+ YRS, FIRST 15 MIN: ICD-10-PCS | Mod: ,,, | Performed by: INTERNAL MEDICINE

## 2020-12-10 PROCEDURE — 37252 INTRVASC US NONCORONARY 1ST: CPT | Mod: ,,, | Performed by: INTERNAL MEDICINE

## 2020-12-10 PROCEDURE — C1769 GUIDE WIRE: HCPCS | Performed by: INTERNAL MEDICINE

## 2020-12-10 PROCEDURE — 37187 PR THROMBECTOMY, VENOUS: ICD-10-PCS | Mod: 50,,, | Performed by: INTERNAL MEDICINE

## 2020-12-10 PROCEDURE — 85347 COAGULATION TIME ACTIVATED: CPT | Performed by: INTERNAL MEDICINE

## 2020-12-10 PROCEDURE — 94660 CPAP INITIATION&MGMT: CPT

## 2020-12-10 PROCEDURE — 85025 COMPLETE CBC W/AUTO DIFF WBC: CPT

## 2020-12-10 PROCEDURE — C1894 INTRO/SHEATH, NON-LASER: HCPCS | Performed by: INTERNAL MEDICINE

## 2020-12-10 PROCEDURE — 36010 PR PLACE CATH IN VEIN,SVC,IVC: ICD-10-PCS | Mod: 50,51,, | Performed by: INTERNAL MEDICINE

## 2020-12-10 PROCEDURE — 37248 PR TRANSLML BALLOON ANGIO, OPEN/PERC, INITIAL VEIN: ICD-10-PCS | Mod: 51,59,RT, | Performed by: INTERNAL MEDICINE

## 2020-12-10 PROCEDURE — 99153 MOD SED SAME PHYS/QHP EA: CPT | Performed by: INTERNAL MEDICINE

## 2020-12-10 PROCEDURE — 99152 MOD SED SAME PHYS/QHP 5/>YRS: CPT | Performed by: INTERNAL MEDICINE

## 2020-12-10 PROCEDURE — 63600175 PHARM REV CODE 636 W HCPCS: Performed by: EMERGENCY MEDICINE

## 2020-12-10 PROCEDURE — 83735 ASSAY OF MAGNESIUM: CPT

## 2020-12-10 PROCEDURE — 36010 PLACE CATHETER IN VEIN: CPT | Mod: 50,51,, | Performed by: INTERNAL MEDICINE

## 2020-12-10 PROCEDURE — 63600175 PHARM REV CODE 636 W HCPCS: Performed by: INTERNAL MEDICINE

## 2020-12-10 PROCEDURE — 99152 MOD SED SAME PHYS/QHP 5/>YRS: CPT | Mod: ,,, | Performed by: INTERNAL MEDICINE

## 2020-12-10 PROCEDURE — U0003 INFECTIOUS AGENT DETECTION BY NUCLEIC ACID (DNA OR RNA); SEVERE ACUTE RESPIRATORY SYNDROME CORONAVIRUS 2 (SARS-COV-2) (CORONAVIRUS DISEASE [COVID-19]), AMPLIFIED PROBE TECHNIQUE, MAKING USE OF HIGH THROUGHPUT TECHNOLOGIES AS DESCRIBED BY CMS-2020-01-R: HCPCS

## 2020-12-10 PROCEDURE — 25500020 PHARM REV CODE 255: Performed by: INTERNAL MEDICINE

## 2020-12-10 PROCEDURE — 37252 PR IVUS, NON-CORONARY, 1ST VESSEL: ICD-10-PCS | Mod: ,,, | Performed by: INTERNAL MEDICINE

## 2020-12-10 PROCEDURE — 36010 PLACE CATHETER IN VEIN: CPT | Mod: 50 | Performed by: INTERNAL MEDICINE

## 2020-12-10 PROCEDURE — 37239 OPEN/PERQ PLACE STENT EA ADD: CPT | Performed by: INTERNAL MEDICINE

## 2020-12-10 PROCEDURE — 37238 PR OPEN/PERQ TRANSCATH PLACE STENT SAME VESSEL; INITIAL VEIN: ICD-10-PCS | Mod: 51,RT,, | Performed by: INTERNAL MEDICINE

## 2020-12-10 PROCEDURE — 37248 TRLUML BALO ANGIOP 1ST VEIN: CPT | Mod: 59,RT | Performed by: INTERNAL MEDICINE

## 2020-12-10 PROCEDURE — 37239 OPEN/PERQ PLACE STENT EA ADD: CPT | Mod: ,,, | Performed by: INTERNAL MEDICINE

## 2020-12-10 PROCEDURE — 63600175 PHARM REV CODE 636 W HCPCS: Performed by: NURSE PRACTITIONER

## 2020-12-10 PROCEDURE — C1757 CATH, THROMBECTOMY/EMBOLECT: HCPCS | Performed by: INTERNAL MEDICINE

## 2020-12-10 PROCEDURE — 37248 TRLUML BALO ANGIOP 1ST VEIN: CPT | Mod: 51,59,RT, | Performed by: INTERNAL MEDICINE

## 2020-12-10 PROCEDURE — 11000001 HC ACUTE MED/SURG PRIVATE ROOM

## 2020-12-10 PROCEDURE — 99900035 HC TECH TIME PER 15 MIN (STAT)

## 2020-12-10 PROCEDURE — C1753 CATH, INTRAVAS ULTRASOUND: HCPCS | Performed by: INTERNAL MEDICINE

## 2020-12-10 PROCEDURE — 37252 INTRVASC US NONCORONARY 1ST: CPT | Performed by: INTERNAL MEDICINE

## 2020-12-10 PROCEDURE — 37253 PR INTRVASC US NON CORONARY, EACH ADDL VESSEL, S&I, ADD ON CODE: ICD-10-PCS | Mod: ,,, | Performed by: INTERNAL MEDICINE

## 2020-12-10 PROCEDURE — 94761 N-INVAS EAR/PLS OXIMETRY MLT: CPT

## 2020-12-10 PROCEDURE — 27201423 OPTIME MED/SURG SUP & DEVICES STERILE SUPPLY: Performed by: INTERNAL MEDICINE

## 2020-12-10 PROCEDURE — 80053 COMPREHEN METABOLIC PANEL: CPT

## 2020-12-10 PROCEDURE — C1725 CATH, TRANSLUMIN NON-LASER: HCPCS | Performed by: INTERNAL MEDICINE

## 2020-12-10 PROCEDURE — 37238 OPEN/PERQ PLACE STENT SAME: CPT | Mod: 51,RT,, | Performed by: INTERNAL MEDICINE

## 2020-12-10 PROCEDURE — 85730 THROMBOPLASTIN TIME PARTIAL: CPT | Mod: 91

## 2020-12-10 PROCEDURE — 37238 OPEN/PERQ PLACE STENT SAME: CPT | Mod: RT | Performed by: INTERNAL MEDICINE

## 2020-12-10 PROCEDURE — 25000003 PHARM REV CODE 250: Performed by: INTERNAL MEDICINE

## 2020-12-10 DEVICE — VENOVO® VENOUS STENT SYSTEM 20 MM X 160 MM (120 CM DELIVERY CATHETER)
Type: IMPLANTABLE DEVICE | Site: LEG | Status: FUNCTIONAL
Brand: VENOVO®

## 2020-12-10 DEVICE — VENOVO® VENOUS STENT SYSTEM 18 MM X 100 MM (120 CM DELIVERY CATHETER)
Type: IMPLANTABLE DEVICE | Site: LEG | Status: FUNCTIONAL
Brand: VENOVO®

## 2020-12-10 DEVICE — VENOVO® VENOUS STENT SYSTEM 20 MM X 140 MM (80 CM DELIVERY CATHETER)
Type: IMPLANTABLE DEVICE | Site: LEG | Status: FUNCTIONAL
Brand: VENOVO®

## 2020-12-10 RX ORDER — ONDANSETRON 8 MG/1
8 TABLET, ORALLY DISINTEGRATING ORAL EVERY 8 HOURS PRN
Status: DISCONTINUED | OUTPATIENT
Start: 2020-12-10 | End: 2020-12-12 | Stop reason: HOSPADM

## 2020-12-10 RX ORDER — LIDOCAINE HYDROCHLORIDE 10 MG/ML
INJECTION INFILTRATION; PERINEURAL
Status: DISCONTINUED | OUTPATIENT
Start: 2020-12-10 | End: 2020-12-10 | Stop reason: HOSPADM

## 2020-12-10 RX ORDER — DIPHENHYDRAMINE HYDROCHLORIDE 50 MG/ML
INJECTION INTRAMUSCULAR; INTRAVENOUS
Status: DISCONTINUED | OUTPATIENT
Start: 2020-12-10 | End: 2020-12-10 | Stop reason: HOSPADM

## 2020-12-10 RX ORDER — MIDAZOLAM HYDROCHLORIDE 1 MG/ML
INJECTION INTRAMUSCULAR; INTRAVENOUS
Status: DISCONTINUED | OUTPATIENT
Start: 2020-12-10 | End: 2020-12-10 | Stop reason: HOSPADM

## 2020-12-10 RX ORDER — FENTANYL CITRATE 50 UG/ML
INJECTION, SOLUTION INTRAMUSCULAR; INTRAVENOUS
Status: DISCONTINUED | OUTPATIENT
Start: 2020-12-10 | End: 2020-12-10 | Stop reason: HOSPADM

## 2020-12-10 RX ORDER — IODIXANOL 320 MG/ML
INJECTION, SOLUTION INTRAVASCULAR
Status: DISCONTINUED | OUTPATIENT
Start: 2020-12-10 | End: 2020-12-10 | Stop reason: HOSPADM

## 2020-12-10 RX ORDER — KETOROLAC TROMETHAMINE 30 MG/ML
15 INJECTION, SOLUTION INTRAMUSCULAR; INTRAVENOUS ONCE
Status: COMPLETED | OUTPATIENT
Start: 2020-12-10 | End: 2020-12-10

## 2020-12-10 RX ORDER — HEPARIN SODIUM 1000 [USP'U]/ML
INJECTION, SOLUTION INTRAVENOUS; SUBCUTANEOUS
Status: DISCONTINUED | OUTPATIENT
Start: 2020-12-10 | End: 2020-12-10

## 2020-12-10 RX ORDER — ACETAMINOPHEN 325 MG/1
650 TABLET ORAL EVERY 4 HOURS PRN
Status: DISCONTINUED | OUTPATIENT
Start: 2020-12-10 | End: 2020-12-11

## 2020-12-10 RX ADMIN — HYDROCODONE BITARTRATE AND ACETAMINOPHEN 1 TABLET: 5; 325 TABLET ORAL at 12:12

## 2020-12-10 RX ADMIN — ASPIRIN 81 MG: 81 TABLET, COATED ORAL at 10:12

## 2020-12-10 RX ADMIN — LOSARTAN POTASSIUM 50 MG: 50 TABLET, FILM COATED ORAL at 10:12

## 2020-12-10 RX ADMIN — ATORVASTATIN CALCIUM 40 MG: 40 TABLET, FILM COATED ORAL at 10:12

## 2020-12-10 RX ADMIN — METOPROLOL SUCCINATE 25 MG: 25 TABLET, FILM COATED, EXTENDED RELEASE ORAL at 10:12

## 2020-12-10 RX ADMIN — HYDROCODONE BITARTRATE AND ACETAMINOPHEN 1 TABLET: 5; 325 TABLET ORAL at 07:12

## 2020-12-10 RX ADMIN — HEPARIN SODIUM AND DEXTROSE 17 UNITS/KG/HR: 10000; 5 INJECTION INTRAVENOUS at 12:12

## 2020-12-10 RX ADMIN — KETOROLAC TROMETHAMINE 15 MG: 30 INJECTION, SOLUTION INTRAMUSCULAR at 09:12

## 2020-12-11 ENCOUNTER — CLINICAL SUPPORT (OUTPATIENT)
Dept: SMOKING CESSATION | Facility: CLINIC | Age: 35
End: 2020-12-11

## 2020-12-11 DIAGNOSIS — F17.210 CIGARETTE SMOKER: Primary | ICD-10-CM

## 2020-12-11 LAB
ALBUMIN SERPL BCP-MCNC: 3.2 G/DL (ref 3.5–5.2)
ALBUMIN SERPL BCP-MCNC: 3.2 G/DL (ref 3.5–5.2)
ALP SERPL-CCNC: 76 U/L (ref 55–135)
ALP SERPL-CCNC: 82 U/L (ref 55–135)
ALT SERPL W/O P-5'-P-CCNC: 17 U/L (ref 10–44)
ALT SERPL W/O P-5'-P-CCNC: 19 U/L (ref 10–44)
ANION GAP SERPL CALC-SCNC: 11 MMOL/L (ref 8–16)
ANION GAP SERPL CALC-SCNC: 11 MMOL/L (ref 8–16)
AST SERPL-CCNC: 15 U/L (ref 10–40)
AST SERPL-CCNC: 16 U/L (ref 10–40)
BASOPHILS # BLD AUTO: 0.02 K/UL (ref 0–0.2)
BASOPHILS NFR BLD: 0.2 % (ref 0–1.9)
BILIRUB SERPL-MCNC: 0.8 MG/DL (ref 0.1–1)
BILIRUB SERPL-MCNC: 0.8 MG/DL (ref 0.1–1)
BUN SERPL-MCNC: 14 MG/DL (ref 6–20)
BUN SERPL-MCNC: 14 MG/DL (ref 6–20)
CALCIUM SERPL-MCNC: 8.5 MG/DL (ref 8.7–10.5)
CALCIUM SERPL-MCNC: 8.5 MG/DL (ref 8.7–10.5)
CHLORIDE SERPL-SCNC: 100 MMOL/L (ref 95–110)
CHLORIDE SERPL-SCNC: 100 MMOL/L (ref 95–110)
CO2 SERPL-SCNC: 28 MMOL/L (ref 23–29)
CO2 SERPL-SCNC: 28 MMOL/L (ref 23–29)
CREAT SERPL-MCNC: 1.3 MG/DL (ref 0.5–1.4)
CREAT SERPL-MCNC: 1.4 MG/DL (ref 0.5–1.4)
DIFFERENTIAL METHOD: ABNORMAL
EOSINOPHIL # BLD AUTO: 0 K/UL (ref 0–0.5)
EOSINOPHIL NFR BLD: 0.2 % (ref 0–8)
ERYTHROCYTE [DISTWIDTH] IN BLOOD BY AUTOMATED COUNT: 13.2 % (ref 11.5–14.5)
EST. GFR  (AFRICAN AMERICAN): >60 ML/MIN/1.73 M^2
EST. GFR  (AFRICAN AMERICAN): >60 ML/MIN/1.73 M^2
EST. GFR  (NON AFRICAN AMERICAN): >60 ML/MIN/1.73 M^2
EST. GFR  (NON AFRICAN AMERICAN): >60 ML/MIN/1.73 M^2
GLUCOSE SERPL-MCNC: 150 MG/DL (ref 70–110)
GLUCOSE SERPL-MCNC: 154 MG/DL (ref 70–110)
HCT VFR BLD AUTO: 35.7 % (ref 40–54)
HGB BLD-MCNC: 11.9 G/DL (ref 14–18)
IMM GRANULOCYTES # BLD AUTO: 0.06 K/UL (ref 0–0.04)
IMM GRANULOCYTES NFR BLD AUTO: 0.5 % (ref 0–0.5)
LYMPHOCYTES # BLD AUTO: 2.3 K/UL (ref 1–4.8)
LYMPHOCYTES NFR BLD: 17.7 % (ref 18–48)
MAGNESIUM SERPL-MCNC: 1.8 MG/DL (ref 1.6–2.6)
MAGNESIUM SERPL-MCNC: 1.8 MG/DL (ref 1.6–2.6)
MCH RBC QN AUTO: 28.2 PG (ref 27–31)
MCHC RBC AUTO-ENTMCNC: 33.3 G/DL (ref 32–36)
MCV RBC AUTO: 85 FL (ref 82–98)
MONOCYTES # BLD AUTO: 0.7 K/UL (ref 0.3–1)
MONOCYTES NFR BLD: 5 % (ref 4–15)
NEUTROPHILS # BLD AUTO: 10 K/UL (ref 1.8–7.7)
NEUTROPHILS NFR BLD: 76.4 % (ref 38–73)
NRBC BLD-RTO: 0 /100 WBC
PLATELET # BLD AUTO: 183 K/UL (ref 150–350)
PMV BLD AUTO: 10.3 FL (ref 9.2–12.9)
POCT GLUCOSE: 112 MG/DL (ref 70–110)
POCT GLUCOSE: 112 MG/DL (ref 70–110)
POCT GLUCOSE: 119 MG/DL (ref 70–110)
POCT GLUCOSE: 134 MG/DL (ref 70–110)
POCT GLUCOSE: 137 MG/DL (ref 70–110)
POTASSIUM SERPL-SCNC: 3.4 MMOL/L (ref 3.5–5.1)
POTASSIUM SERPL-SCNC: 3.4 MMOL/L (ref 3.5–5.1)
PROT SERPL-MCNC: 7.3 G/DL (ref 6–8.4)
PROT SERPL-MCNC: 7.4 G/DL (ref 6–8.4)
RBC # BLD AUTO: 4.22 M/UL (ref 4.6–6.2)
SARS-COV-2 RNA RESP QL NAA+PROBE: NOT DETECTED
SODIUM SERPL-SCNC: 139 MMOL/L (ref 136–145)
SODIUM SERPL-SCNC: 139 MMOL/L (ref 136–145)
WBC # BLD AUTO: 13.04 K/UL (ref 3.9–12.7)

## 2020-12-11 PROCEDURE — 25000003 PHARM REV CODE 250: Performed by: NURSE PRACTITIONER

## 2020-12-11 PROCEDURE — 36415 COLL VENOUS BLD VENIPUNCTURE: CPT

## 2020-12-11 PROCEDURE — 99406 PR TOBACCO USE CESSATION INTERMEDIATE 3-10 MINUTES: ICD-10-PCS | Mod: S$GLB,,,

## 2020-12-11 PROCEDURE — 99406 BEHAV CHNG SMOKING 3-10 MIN: CPT | Mod: S$GLB,,,

## 2020-12-11 PROCEDURE — 80053 COMPREHEN METABOLIC PANEL: CPT

## 2020-12-11 PROCEDURE — 83735 ASSAY OF MAGNESIUM: CPT | Mod: 91

## 2020-12-11 PROCEDURE — 94761 N-INVAS EAR/PLS OXIMETRY MLT: CPT

## 2020-12-11 PROCEDURE — 85025 COMPLETE CBC W/AUTO DIFF WBC: CPT

## 2020-12-11 PROCEDURE — 25000003 PHARM REV CODE 250: Performed by: HOSPITALIST

## 2020-12-11 PROCEDURE — 99900035 HC TECH TIME PER 15 MIN (STAT)

## 2020-12-11 PROCEDURE — 11000001 HC ACUTE MED/SURG PRIVATE ROOM

## 2020-12-11 PROCEDURE — 25000003 PHARM REV CODE 250: Performed by: INTERNAL MEDICINE

## 2020-12-11 PROCEDURE — 94660 CPAP INITIATION&MGMT: CPT

## 2020-12-11 RX ORDER — POTASSIUM CHLORIDE 20 MEQ/1
40 TABLET, EXTENDED RELEASE ORAL ONCE
Status: COMPLETED | OUTPATIENT
Start: 2020-12-11 | End: 2020-12-11

## 2020-12-11 RX ORDER — ACETAMINOPHEN 325 MG/1
650 TABLET ORAL EVERY 4 HOURS PRN
Status: DISCONTINUED | OUTPATIENT
Start: 2020-12-11 | End: 2020-12-12 | Stop reason: HOSPADM

## 2020-12-11 RX ADMIN — ACETAMINOPHEN 650 MG: 325 TABLET ORAL at 08:12

## 2020-12-11 RX ADMIN — LOSARTAN POTASSIUM 50 MG: 50 TABLET, FILM COATED ORAL at 08:12

## 2020-12-11 RX ADMIN — APIXABAN 10 MG: 5 TABLET, FILM COATED ORAL at 08:12

## 2020-12-11 RX ADMIN — APIXABAN 5 MG: 5 TABLET, FILM COATED ORAL at 01:12

## 2020-12-11 RX ADMIN — HYDROCODONE BITARTRATE AND ACETAMINOPHEN 1 TABLET: 5; 325 TABLET ORAL at 11:12

## 2020-12-11 RX ADMIN — POTASSIUM CHLORIDE 40 MEQ: 1500 TABLET, EXTENDED RELEASE ORAL at 01:12

## 2020-12-11 RX ADMIN — APIXABAN 5 MG: 5 TABLET, FILM COATED ORAL at 08:12

## 2020-12-11 RX ADMIN — METOPROLOL SUCCINATE 25 MG: 25 TABLET, FILM COATED, EXTENDED RELEASE ORAL at 08:12

## 2020-12-11 RX ADMIN — HYDROCODONE BITARTRATE AND ACETAMINOPHEN 1 TABLET: 5; 325 TABLET ORAL at 08:12

## 2020-12-11 RX ADMIN — ASPIRIN 81 MG: 81 TABLET, COATED ORAL at 08:12

## 2020-12-11 RX ADMIN — ATORVASTATIN CALCIUM 40 MG: 40 TABLET, FILM COATED ORAL at 08:12

## 2020-12-11 NOTE — PROGRESS NOTES
Report given to nurse Rhoades. Bedside assessment performed upon arrival of pt to unit, which included rachel pop vein site. Both sites cdi, soft, no bleeding or hematoma. Pt aaox4, neuro intact. Rates back pain (from positioning on table) 6/10 post PRN pain med. States it is better now. Tele box applied. Bedrest reinforced w pt, bed alarm set. Receiving nurse verbalized full understanding of report, and denies further questions. Pt's care released to Telemetry at this time.

## 2020-12-11 NOTE — PLAN OF CARE
Problem: Adult Inpatient Plan of Care  Goal: Chart reviewed and care plan updated  Outcome: Ongoing, Progressing   VN completed admission questions with patient. Plan of care was discussed including VTE prophylaxis of eliquis.  All questions answered.  Haylee Nogueira NP notified of patients pain remaining after he was adiministered ordered pain medications, and will place appropriate orders. Education given on safety measures and using call light before getting out of bed by himself. Patient verbalized understanding. Bed locked and in lowest position. Alarm on.

## 2020-12-11 NOTE — PLAN OF CARE
Pt laying supine in bed, AAOx4. Complains of pain 6/10 in back. PRN toradol given. No signs of distress noted. Accu checks Q6, NSR on tele. Bed locked and in lowest position with bed alarm on, call light within reach. Instructed to call for assistance if needed.

## 2020-12-11 NOTE — HOSPITAL COURSE
"Mr. Damian presented with chest pain and SOB. Admitted with possible NSTEMI with troponin peak of 1.103. Further workup with CTA of the chest revealed "significant pulmonary embolism in which there are sagittal like distributions of embolic material involving the bifurcation of the right left main pulmonary arteries.  There are portions of the 1st order branches which are occluded by approximately 90%."  U/S of the LE remarkable for "thrombus seen within the right common femoral, femoral, greater saphenous, and popliteal veins." Anticoagulation initiated with heparin gtt and Cardiology consulted. Troponins trended flat and Cardiology felt it was secondary demand in setting of PE. Due to his extensive clot burden, patient underwent successful thrombectomy of PE on 12/9/20 and thrombectomy of RLE DVT on 12/10/20. Patient was transitioned to apixaban for anticoagulation post procedure. Additionally, patient reported exposure to family members that tested COVID 19 positive (his son and wife). He was not hypoxic and was stable on RA, and initial COVID 19 (rapid) test on 12/8/20 was negative. Isolation protocol initiated until repeat test resulted - his repeat COVID 19 (routine) test on 12/10/20 was also negative. Patient given results and patient plans to stay with other family members while his family is in quarantine. He was educated to take apixaban 10 mg BID x 7 days and then to take 5 mg BID thereafter, and that his duration of treatment will be for 6 months. He was arranged [facility], [facility] and Cardiology.   "

## 2020-12-11 NOTE — PROGRESS NOTES
Pt's belongings located still in prev room (254). Unit notified need to bring belongings to new room 421. Verbalized understanding.

## 2020-12-11 NOTE — PROGRESS NOTES
Pt transported to new room 421. Transfer via stretcher w portable monitor in place. Pt w face mask in place. Pt aaoxx4, neuro intact. Pt transfer from stretcher to bed x3 assistance and transfer board. Memo pop vein access site cont cdi and WNL. No s/s of bleeding or hematoma. Fall risk precautions in place. Will cont to monitor.

## 2020-12-11 NOTE — PLAN OF CARE
VN cued into pt's room for introduction. VN informed pt that VN would be working along side bedside nurse and PCT throughout shift. Level of present pain assessed. At present no distress noted. No resp distress noted at this time. Discussed with patient High fall risk protocol and interventions that have been initiated and cont be in place for safety. Patient verbalized clear understanding and cooperation using teach back method. Bed alarm presently activated and in use. Will cont to be available to patient and intervene prn.

## 2020-12-11 NOTE — ASSESSMENT & PLAN NOTE
-CTA of chest with significant clot burden  -Treated with heparin gtt and then underwent thrombectomy by Cardiology on 12/9  -U/S with extensive DVT in RLE s/p thrombectomy on 12/10  -Patient was transitioned to apixaban for anticoagulation post procedure  -He is not hypoxic and was stable on RA  -Plan to discharge tomorrow if patient continues to do well on apixaban

## 2020-12-11 NOTE — PLAN OF CARE
Pt laying supine in bed, disoriented to person, place, time and situation. No signs of pain or distress noted. Soft wrist restraints renewed by Myles Ramos MD, pt still pulling at nasal cannula and IV sites and trying to exit bed. No tele orders, 2.5L NC sats 95-98%. Telesitter at bedside. Bed locked and in lowest position with bed alarm set. Call light within reach.

## 2020-12-11 NOTE — PLAN OF CARE
Pt to F/u with PCC and Cardiology in 3-4 wks.     Future Appointments   Date Time Provider Department Center   1/5/2021  9:00 AM Juanis Salcedo MD [facility]        12/11/20 1612   Post-Acute Status   Post-Acute Authorization Other   Other Status Awaiting f/u Appts   Discharge Plan   Discharge Plan A Home with family     Verona Lemus RN  RN Transition Navigator  [phone]

## 2020-12-11 NOTE — PROGRESS NOTES
Patient seen via virtual visit. States he smokes cigars, and has been cutting back on smoking for a few months now. He is down to smoking 1 cigar every 2 days. He states that he got tired of smoking them, and decided to start cutting back on his own. He denies the need for NRT, and is confident he will be able to quit totally in the near future.

## 2020-12-11 NOTE — PLAN OF CARE
Pt has a temp of 100.5*F. No orders in place for fevers. Notified Glynn RAO. Ordered 650 mg Tylenol. Administered to pt.

## 2020-12-11 NOTE — PROGRESS NOTES
[facility] Hospital Medicine  Progress Note    Patient Name: Beth Damian Jr.  MRN: [id number]  Patient Class: IP- Inpatient   Admission Date: 12/8/2020  Length of Stay: 1 days  Attending Physician: Ciera Maki MD  Primary Care Provider: [facility]        Subjective:     Principal Problem:Pulmonary embolus        HPI:  Beth Damian is a 34 yo male with a pmh of HTN, HLD, tobacco abuse and DM who presented to ED with complaint of chest pain. Pt reports onset of constant non-radiating retrosternal chest pressure/throbbing early am on 12/8 associated with SOB. He denies n/v, diaphoresis and syncope. No lower extremity edema, cough or congestion. No similar events in the past . No fever/chills. Pt reports family hx of CAD. His wife and child recently tested positive for COVID 19.     Pt tachycardic and hypertensive on arrival to ED. Labs remarkable for troponin 1.103. EKG shows tachycardic with incomplete right bundle-branch block.  No ST segment elevation or depression.  Nonspecific inferior and anterolateral T-wave changes.  He received morphine and zofran. Pt states chest pain resolved on my exam. He reports compliance with home antihypertensive. Heparin gtt initiated per ED. Patient admitted to [facility].       Interval History: No acute events, feeling overall better.     Review of Systems   Constitutional: Negative for chills and fever.   Respiratory: Negative for shortness of breath.    Cardiovascular: Negative for chest pain.     Objective:     Vital Signs (Most Recent):  Temp: 97.8 °F (36.6 °C) (12/10/20 1045)  Pulse: 90 (12/10/20 1925)  Resp: 20 (12/10/20 1928)  BP: (!) 144/78 (12/10/20 1925)  SpO2: 99 % (12/10/20 1925) Vital Signs (24h Range):  Temp:  [97.8 °F (36.6 °C)-98.9 °F (37.2 °C)] 97.8 °F (36.6 °C)  Pulse:  [] 90  Resp:  [18-22] 20  SpO2:  [98 %-100 %] 99 %  BP: (136-155)/(66-95) 144/78     Weight: (!) 146.1 kg (322 lb)  Body  mass index is 42.48 kg/m².    Intake/Output Summary (Last 24 hours) at 12/10/2020 1946  Last data filed at 12/10/2020 1928  Gross per 24 hour   Intake --   Output 1500 ml   Net -1500 ml      Physical Exam  Constitutional:       General: He is not in acute distress.     Appearance: He is well-developed. He is obese.   HENT:      Head: Normocephalic and atraumatic.   Eyes:      Conjunctiva/sclera: Conjunctivae normal.      Pupils: Pupils are equal, round, and reactive to light.   Neck:      Musculoskeletal: Normal range of motion and neck supple.      Vascular: No JVD.   Cardiovascular:      Rate and Rhythm: Normal rate and regular rhythm.      Heart sounds: Normal heart sounds.   Pulmonary:      Effort: Pulmonary effort is normal. No respiratory distress.      Breath sounds: Normal breath sounds. No wheezing.   Abdominal:      General: Bowel sounds are normal. There is no distension.      Palpations: Abdomen is soft.      Tenderness: There is no abdominal tenderness. There is no guarding.   Musculoskeletal: Normal range of motion.         General: No tenderness.   Skin:     General: Skin is warm and dry.      Capillary Refill: Capillary refill takes less than 2 seconds.      Findings: No erythema.   Neurological:      Mental Status: He is alert and oriented to person, place, and time.   Psychiatric:         Behavior: Behavior normal.         Significant Labs: All pertinent labs within the past 24 hours have been reviewed.    Significant Imaging: I have reviewed and interpreted all pertinent imaging results/findings within the past 24 hours.      Assessment/Plan:      * Pulmonary embolus  -CTA of chest with significant clot burden  -Treated with heparin gtt and then underwent thrombectomy by Cardiology on 12/9  -U/S with extensive DVT with plans for thrombectomy today  -Support with O2 and continue anticoagulation    Special screening examination for unspecified viral disease  -COVID negative but known exposure with  close family  -Repeat COVID test pending, and isolation protocol until resulted    NSTEMI (non-ST elevated myocardial infarction)  -Elevated troponins likely due to PE   -As discuss above    BMI 37.0-37.9, adult  -Weight loss as outpatient    Mixed hyperlipidemia  -Continue atorvastatin     Prediabetes  -HgbA1C 7.2  -SSI, accucheck AC&HS, Diabetic diet     Essential hypertension  -Continue losartan 50 mg daily. No longer taking chlorthalidone       VTE Risk Mitigation (From admission, onward)         Ordered     apixaban tablet 5 mg  2 times daily      12/10/20 1840     IP VTE HIGH RISK PATIENT  Once      12/09/20 0208     Place sequential compression device  Until discontinued      12/09/20 0208                  Ciera Maki MD  Department of Hospital Medicine   [facility]

## 2020-12-11 NOTE — SUBJECTIVE & OBJECTIVE
Interval History: No acute issues, patient feels okay except for some right hip pain from laying flat yesterday for long period of time. Low grade time noted, but he denies feeling feverish.    Review of Systems   Constitutional: Negative for chills.   Respiratory: Negative for shortness of breath.    Cardiovascular: Negative for chest pain.     Objective:     Vital Signs (Most Recent):  Temp: 97.4 °F (36.3 °C) (12/11/20 1533)  Pulse: 97 (12/11/20 1533)  Resp: (!) 22 (12/11/20 1533)  BP: 117/74 (12/11/20 1533)  SpO2: (!) 94 % (12/11/20 1533) Vital Signs (24h Range):  Temp:  [97.4 °F (36.3 °C)-100.5 °F (38.1 °C)] 97.4 °F (36.3 °C)  Pulse:  [] 97  Resp:  [17-22] 22  SpO2:  [93 %-100 %] 94 %  BP: (117-159)/(74-95) 117/74     Weight: (!) 146.1 kg (322 lb)  Body mass index is 42.48 kg/m².    Intake/Output Summary (Last 24 hours) at 12/11/2020 1622  Last data filed at 12/11/2020 0800  Gross per 24 hour   Intake 305 ml   Output 1375 ml   Net -1070 ml      Physical Exam  Constitutional:       General: He is not in acute distress.     Appearance: He is well-developed. He is obese.   HENT:      Head: Normocephalic and atraumatic.   Eyes:      Conjunctiva/sclera: Conjunctivae normal.      Pupils: Pupils are equal, round, and reactive to light.   Neck:      Musculoskeletal: Normal range of motion and neck supple.      Vascular: No JVD.   Cardiovascular:      Rate and Rhythm: Normal rate and regular rhythm.      Heart sounds: Normal heart sounds.   Pulmonary:      Effort: Pulmonary effort is normal. No respiratory distress.      Breath sounds: Normal breath sounds. No wheezing.   Abdominal:      General: Bowel sounds are normal. There is no distension.      Palpations: Abdomen is soft.      Tenderness: There is no abdominal tenderness. There is no guarding.   Musculoskeletal: Normal range of motion.         General: No tenderness.   Skin:     General: Skin is warm and dry.      Capillary Refill: Capillary refill takes less  than 2 seconds.      Findings: No erythema.   Neurological:      Mental Status: He is alert and oriented to person, place, and time.   Psychiatric:         Behavior: Behavior normal.         Significant Labs: All pertinent labs within the past 24 hours have been reviewed.    Significant Imaging: I have reviewed and interpreted all pertinent imaging results/findings within the past 24 hours.

## 2020-12-11 NOTE — ASSESSMENT & PLAN NOTE
-COVID negative but known exposure with close family  -Repeat COVID test pending, and isolation protocol until resulted

## 2020-12-11 NOTE — SUBJECTIVE & OBJECTIVE
Interval History: No acute events, feeling overall better.     Review of Systems   Constitutional: Negative for chills and fever.   Respiratory: Negative for shortness of breath.    Cardiovascular: Negative for chest pain.     Objective:     Vital Signs (Most Recent):  Temp: 97.8 °F (36.6 °C) (12/10/20 1045)  Pulse: 90 (12/10/20 1925)  Resp: 20 (12/10/20 1928)  BP: (!) 144/78 (12/10/20 1925)  SpO2: 99 % (12/10/20 1925) Vital Signs (24h Range):  Temp:  [97.8 °F (36.6 °C)-98.9 °F (37.2 °C)] 97.8 °F (36.6 °C)  Pulse:  [] 90  Resp:  [18-22] 20  SpO2:  [98 %-100 %] 99 %  BP: (136-155)/(66-95) 144/78     Weight: (!) 146.1 kg (322 lb)  Body mass index is 42.48 kg/m².    Intake/Output Summary (Last 24 hours) at 12/10/2020 1946  Last data filed at 12/10/2020 1928  Gross per 24 hour   Intake --   Output 1500 ml   Net -1500 ml      Physical Exam  Constitutional:       General: He is not in acute distress.     Appearance: He is well-developed. He is obese.   HENT:      Head: Normocephalic and atraumatic.   Eyes:      Conjunctiva/sclera: Conjunctivae normal.      Pupils: Pupils are equal, round, and reactive to light.   Neck:      Musculoskeletal: Normal range of motion and neck supple.      Vascular: No JVD.   Cardiovascular:      Rate and Rhythm: Normal rate and regular rhythm.      Heart sounds: Normal heart sounds.   Pulmonary:      Effort: Pulmonary effort is normal. No respiratory distress.      Breath sounds: Normal breath sounds. No wheezing.   Abdominal:      General: Bowel sounds are normal. There is no distension.      Palpations: Abdomen is soft.      Tenderness: There is no abdominal tenderness. There is no guarding.   Musculoskeletal: Normal range of motion.         General: No tenderness.   Skin:     General: Skin is warm and dry.      Capillary Refill: Capillary refill takes less than 2 seconds.      Findings: No erythema.   Neurological:      Mental Status: He is alert and oriented to person, place, and  time.   Psychiatric:         Behavior: Behavior normal.         Significant Labs: All pertinent labs within the past 24 hours have been reviewed.    Significant Imaging: I have reviewed and interpreted all pertinent imaging results/findings within the past 24 hours.

## 2020-12-11 NOTE — PROGRESS NOTES
"[facility]\"" Medicine  Progress Note    Patient Name: Beth Damian Jr.  MRN: [id number]  Patient Class: IP- Inpatient   Admission Date: 12/8/2020  Length of Stay: 2 days  Attending Physician: Ciera Maki MD  Primary Care Provider: [facility]        Subjective:     Principal Problem:Pulmonary embolus        HPI:  Beth Damian is a 34 yo male with a pmh of HTN, HLD, tobacco abuse and DM who presented to ED with complaint of chest pain. Pt reports onset of constant non-radiating retrosternal chest pressure/throbbing early am on 12/8 associated with SOB. He denies n/v, diaphoresis and syncope. No lower extremity edema, cough or congestion. No similar events in the past . No fever/chills. Pt reports family hx of CAD. His wife and child recently tested positive for COVID 19.     Pt tachycardic and hypertensive on arrival to ED. Labs remarkable for troponin 1.103. EKG shows tachycardic with incomplete right bundle-branch block.  No ST segment elevation or depression.  Nonspecific inferior and anterolateral T-wave changes.  He received morphine and zofran. Pt states chest pain resolved on my exam. He reports compliance with home antihypertensive. Heparin gtt initiated per ED. Patient admitted to [facility] hospital [facility].     Overview/Hospital Course:  Ms. Damian presented with chest pain and SOB. Admitted with possible NSTEMI with troponin peak of 1.103. Further workup with CTA of the chest revealed "significant pulmonary embolism in which there are sagittal like distributions of embolic material involving the bifurcation of the right left main pulmonary arteries.  There are portions of the 1st order branches which are occluded by approximately 90%." U/S of the LE remarkable for "thrombus seen within the right common femoral, femoral, greater saphenous, and popliteal veins." Anticoagulation initiated with heparin gtt and Cardiology consulted. Troponins trended flat " and Cardiology felt it was secondary demand in setting of PE. Due to his extensive clot burden, patient underwent successful thrombectomy of PE on 12/9/20 and thrombectomy of RLE DVT on 12/10/20. Patient was transitioned to apixaban for anticoagulation post procedure. Additionally, patient reported exposure to family members that test COVID 19 positive (his son and wife). He was not hypoxic and was stable on RA, and initial COVID 19 test was negative. Isolation protocol initiated until repeat test resulted.     Interval History: No acute issues, patient feels okay except for some right hip pain from laying flat yesterday for long period of time. Low grade time noted, but he denies feeling feverish.    Review of Systems   Constitutional: Negative for chills.   Respiratory: Negative for shortness of breath.    Cardiovascular: Negative for chest pain.     Objective:     Vital Signs (Most Recent):  Temp: 97.4 °F (36.3 °C) (12/11/20 1533)  Pulse: 97 (12/11/20 1533)  Resp: (!) 22 (12/11/20 1533)  BP: 117/74 (12/11/20 1533)  SpO2: (!) 94 % (12/11/20 1533) Vital Signs (24h Range):  Temp:  [97.4 °F (36.3 °C)-100.5 °F (38.1 °C)] 97.4 °F (36.3 °C)  Pulse:  [] 97  Resp:  [17-22] 22  SpO2:  [93 %-100 %] 94 %  BP: (117-159)/(74-95) 117/74     Weight: (!) 146.1 kg (322 lb)  Body mass index is 42.48 kg/m².    Intake/Output Summary (Last 24 hours) at 12/11/2020 1622  Last data filed at 12/11/2020 0800  Gross per 24 hour   Intake 305 ml   Output 1375 ml   Net -1070 ml      Physical Exam  Constitutional:       General: He is not in acute distress.     Appearance: He is well-developed. He is obese.   HENT:      Head: Normocephalic and atraumatic.   Eyes:      Conjunctiva/sclera: Conjunctivae normal.      Pupils: Pupils are equal, round, and reactive to light.   Neck:      Musculoskeletal: Normal range of motion and neck supple.      Vascular: No JVD.   Cardiovascular:      Rate and Rhythm: Normal rate and regular rhythm.       Heart sounds: Normal heart sounds.   Pulmonary:      Effort: Pulmonary effort is normal. No respiratory distress.      Breath sounds: Normal breath sounds. No wheezing.   Abdominal:      General: Bowel sounds are normal. There is no distension.      Palpations: Abdomen is soft.      Tenderness: There is no abdominal tenderness. There is no guarding.   Musculoskeletal: Normal range of motion.         General: No tenderness.   Skin:     General: Skin is warm and dry.      Capillary Refill: Capillary refill takes less than 2 seconds.      Findings: No erythema.   Neurological:      Mental Status: He is alert and oriented to person, place, and time.   Psychiatric:         Behavior: Behavior normal.         Significant Labs: All pertinent labs within the past 24 hours have been reviewed.    Significant Imaging: I have reviewed and interpreted all pertinent imaging results/findings within the past 24 hours.      Assessment/Plan:      * Pulmonary embolus  -CTA of chest with significant clot burden  -Treated with heparin gtt and then underwent thrombectomy by Cardiology on 12/9  -U/S with extensive DVT in RLE s/p thrombectomy on 12/10  -Patient was transitioned to apixaban for anticoagulation post procedure  -He is not hypoxic and was stable on RA  -Plan to discharge tomorrow if patient continues to do well on apixaban    Special screening examination for unspecified viral disease  -COVID negative but known exposure with close family  -Repeat COVID test pending, and isolation protocol until resulted    NSTEMI (non-ST elevated myocardial infarction)  -Admitted with possible NSTEMI with troponin peak of 1.103.   -Anticoagulation initiated with heparin gtt and Cardiology consulted.   -Troponins trended flat and Cardiology felt it was secondary demand in setting of PE  -As discussed above    BMI 37.0-37.9, adult  -Weight loss as outpatient    Mixed hyperlipidemia  -Continue atorvastatin     Prediabetes  -HgbA1C 7.2  -SSI,  accucheck AC&HS    Essential hypertension  -Continue losartan 50 mg daily. No longer taking chlorthalidone       VTE Risk Mitigation (From admission, onward)         Ordered     apixaban tablet 5 mg  2 times daily      12/11/20 1248     apixaban tablet 10 mg  2 times daily      12/11/20 1248     IP VTE HIGH RISK PATIENT  Once      12/09/20 0208     Place sequential compression device  Until discontinued      12/09/20 0208                  Ciera Maki MD  Department of Hospital Medicine   [facility]

## 2020-12-11 NOTE — ASSESSMENT & PLAN NOTE
-Admitted with possible NSTEMI with troponin peak of 1.103.   -Anticoagulation initiated with heparin gtt and Cardiology consulted.   -Troponins trended flat and Cardiology felt it was secondary demand in setting of PE  -As discussed above

## 2020-12-11 NOTE — ASSESSMENT & PLAN NOTE
-CTA of chest with significant clot burden  -Treated with heparin gtt and then underwent thrombectomy by Cardiology on 12/9  -U/S with extensive DVT with plans for thrombectomy today  -Support with O2 and continue anticoagulation

## 2020-12-11 NOTE — PROGRESS NOTES
Pt in post cath recovery. Pt slightly drowsy but able to follow commands. Pt aaox4, neuro intact. Denies any pain NV. Bilateral popliteal vein access (posterio knee) site soft, cdi, no active bleeding or hematoma. Pulses present BLE as per doppler. Skin warm and dry. Fall risk precautions in place. Contact precautions in place. Will cont to monitor.

## 2020-12-11 NOTE — BRIEF OP NOTE
Procedure: Mechanical thrombectomy LE b/l, IVUS, b/l iliac stent placement  Access: b/l popliteal vein    Intervention:  - Mechanical thrombectomy b/l with Inari Clotriever  - IVUS with narrowing b/l iliac-femoral veins  - Sp kissing stents [id number] b/l  - Venogram and IVUS with good results     Aspirin 81 mg daily  Apixaban 5 mg bid for at least 6 months     Updated wife over phone

## 2020-12-12 VITALS
RESPIRATION RATE: 20 BRPM | BODY MASS INDEX: 41.75 KG/M2 | HEART RATE: 98 BPM | DIASTOLIC BLOOD PRESSURE: 91 MMHG | SYSTOLIC BLOOD PRESSURE: 164 MMHG | OXYGEN SATURATION: 93 % | WEIGHT: 315 LBS | TEMPERATURE: 99 F | HEIGHT: 73 IN

## 2020-12-12 LAB
ALBUMIN SERPL BCP-MCNC: 3.1 G/DL (ref 3.5–5.2)
ALP SERPL-CCNC: 75 U/L (ref 55–135)
ALT SERPL W/O P-5'-P-CCNC: 19 U/L (ref 10–44)
ANION GAP SERPL CALC-SCNC: 13 MMOL/L (ref 8–16)
AST SERPL-CCNC: 17 U/L (ref 10–40)
BILIRUB SERPL-MCNC: 0.5 MG/DL (ref 0.1–1)
BUN SERPL-MCNC: 13 MG/DL (ref 6–20)
CALCIUM SERPL-MCNC: 8.7 MG/DL (ref 8.7–10.5)
CHLORIDE SERPL-SCNC: 102 MMOL/L (ref 95–110)
CO2 SERPL-SCNC: 22 MMOL/L (ref 23–29)
CREAT SERPL-MCNC: 1.2 MG/DL (ref 0.5–1.4)
EST. GFR  (AFRICAN AMERICAN): >60 ML/MIN/1.73 M^2
EST. GFR  (NON AFRICAN AMERICAN): >60 ML/MIN/1.73 M^2
GLUCOSE SERPL-MCNC: 125 MG/DL (ref 70–110)
MAGNESIUM SERPL-MCNC: 2 MG/DL (ref 1.6–2.6)
POCT GLUCOSE: 128 MG/DL (ref 70–110)
POTASSIUM SERPL-SCNC: 3.7 MMOL/L (ref 3.5–5.1)
PROT SERPL-MCNC: 7.5 G/DL (ref 6–8.4)
SODIUM SERPL-SCNC: 137 MMOL/L (ref 136–145)

## 2020-12-12 PROCEDURE — 36415 COLL VENOUS BLD VENIPUNCTURE: CPT

## 2020-12-12 PROCEDURE — 25000003 PHARM REV CODE 250: Performed by: NURSE PRACTITIONER

## 2020-12-12 PROCEDURE — 80053 COMPREHEN METABOLIC PANEL: CPT

## 2020-12-12 PROCEDURE — 83735 ASSAY OF MAGNESIUM: CPT

## 2020-12-12 PROCEDURE — 90471 IMMUNIZATION ADMIN: CPT | Performed by: HOSPITALIST

## 2020-12-12 PROCEDURE — 63600175 PHARM REV CODE 636 W HCPCS: Performed by: HOSPITALIST

## 2020-12-12 PROCEDURE — 94761 N-INVAS EAR/PLS OXIMETRY MLT: CPT

## 2020-12-12 PROCEDURE — 90686 IIV4 VACC NO PRSV 0.5 ML IM: CPT | Performed by: HOSPITALIST

## 2020-12-12 RX ORDER — ASPIRIN 81 MG/1
81 TABLET ORAL DAILY
Qty: 360 TABLET | Refills: 0 | Status: SHIPPED | OUTPATIENT
Start: 2020-12-12 | End: 2021-01-05 | Stop reason: SDUPTHER

## 2020-12-12 RX ORDER — HYDROCODONE BITARTRATE AND ACETAMINOPHEN 5; 325 MG/1; MG/1
1 TABLET ORAL EVERY 6 HOURS PRN
Qty: 20 TABLET | Refills: 0 | Status: SHIPPED | OUTPATIENT
Start: 2020-12-12 | End: 2021-01-05 | Stop reason: SDUPTHER

## 2020-12-12 RX ORDER — TALC
6 POWDER (GRAM) TOPICAL NIGHTLY PRN
Status: DISCONTINUED | OUTPATIENT
Start: 2020-12-12 | End: 2020-12-12 | Stop reason: HOSPADM

## 2020-12-12 RX ORDER — METOPROLOL SUCCINATE 25 MG/1
25 TABLET, EXTENDED RELEASE ORAL DAILY
Qty: 30 TABLET | Refills: 1 | Status: SHIPPED | OUTPATIENT
Start: 2020-12-12 | End: 2021-01-05 | Stop reason: SDUPTHER

## 2020-12-12 RX ORDER — HYDROCODONE BITARTRATE AND ACETAMINOPHEN 5; 325 MG/1; MG/1
1 TABLET ORAL EVERY 6 HOURS PRN
Qty: 20 TABLET | Refills: 0 | Status: SHIPPED | OUTPATIENT
Start: 2020-12-12 | End: 2020-12-12

## 2020-12-12 RX ADMIN — INFLUENZA VIRUS VACCINE 0.5 ML: 15; 15; 15; 15 SUSPENSION INTRAMUSCULAR at 11:12

## 2020-12-12 RX ADMIN — APIXABAN 10 MG: 5 TABLET, FILM COATED ORAL at 09:12

## 2020-12-12 RX ADMIN — METOPROLOL SUCCINATE 25 MG: 25 TABLET, FILM COATED, EXTENDED RELEASE ORAL at 09:12

## 2020-12-12 RX ADMIN — HYDROCODONE BITARTRATE AND ACETAMINOPHEN 1 TABLET: 5; 325 TABLET ORAL at 09:12

## 2020-12-12 RX ADMIN — Medication 6 MG: at 03:12

## 2020-12-12 RX ADMIN — ASPIRIN 81 MG: 81 TABLET, COATED ORAL at 09:12

## 2020-12-12 RX ADMIN — ATORVASTATIN CALCIUM 40 MG: 40 TABLET, FILM COATED ORAL at 09:12

## 2020-12-12 RX ADMIN — LOSARTAN POTASSIUM 50 MG: 50 TABLET, FILM COATED ORAL at 09:12

## 2020-12-12 RX ADMIN — HYDROCODONE BITARTRATE AND ACETAMINOPHEN 1 TABLET: 5; 325 TABLET ORAL at 03:12

## 2020-12-12 NOTE — DISCHARGE SUMMARY
"[facility]\"" Medicine  Discharge Summary      Patient Name: Beth Damian Jr.  MRN: [id number]  Admission Date: 12/8/2020  Hospital Length of Stay: 3 days  Discharge Date and Time:  12/12/2020 11:58 AM  Attending Physician: Ciera Maki MD   Discharging Provider: Ciera Maki MD  Primary Care Provider: [facility]      HPI:   Beth Damian is a 36 yo male with a pmh of HTN, HLD, tobacco abuse and DM who presented to ED with complaint of chest pain. Pt reports onset of constant non-radiating retrosternal chest pressure/throbbing early am on 12/8 associated with SOB. He denies n/v, diaphoresis and syncope. No lower extremity edema, cough or congestion. No similar events in the past . No fever/chills. Pt reports family hx of CAD. His wife and child recently tested positive for COVID 19.     Pt tachycardic and hypertensive on arrival to ED. Labs remarkable for troponin 1.103. EKG shows tachycardic with incomplete right bundle-branch block.  No ST segment elevation or depression.  Nonspecific inferior and anterolateral T-wave changes.  He received morphine and zofran. Pt states chest pain resolved on my exam. He reports compliance with home antihypertensive. Heparin gtt initiated per ED. Patient admitted to [facility].     Procedure(s) (LRB):  THROMBECTOMY (Bilateral)  PTA, PERIPHERAL VESSEL (N/A)  ULTRASOUND, INTRAVASCULAR (Bilateral)  Stent, Iliac Vein      Hospital Course:   Mr. Damian presented with chest pain and SOB. Admitted with possible NSTEMI with troponin peak of 1.103. Further workup with CTA of the chest revealed "significant pulmonary embolism in which there are sagittal like distributions of embolic material involving the bifurcation of the right left main pulmonary arteries.  There are portions of the 1st order branches which are occluded by approximately 90%."  U/S of the LE remarkable for "thrombus seen within the right common " "femoral, femoral, greater saphenous, and popliteal veins." Anticoagulation initiated with heparin gtt and Cardiology consulted. Troponins trended flat and Cardiology felt it was secondary demand in setting of PE, but did recommend for patient to continue ASA and metoprolol. Due to his extensive clot burden, patient underwent successful thrombectomy of PE on 12/9/20 and thrombectomy of RLE DVT on 12/10/20. Patient was transitioned to apixaban for anticoagulation post procedure. Additionally, patient reported exposure to family members that tested COVID 19 positive (his son and wife). He was not hypoxic and was stable on RA, and initial COVID 19 (rapid) test on 12/8/20 was negative. Isolation protocol initiated until repeat test resulted - his repeat COVID 19 (routine) test on 12/10/20 was also negative. Patient given results and patient plans to stay with other family members while his family is in quarantine. He was educated to take apixaban 10 mg BID x 7 days and then to take 5 mg BID thereafter, and that his duration of treatment will be for 6 months. He was [facility], [facility] and Cardiology.      Consults:   Consults (From admission, onward)        Status Ordering Provider     Inpatient consult to [facility]  Once     Provider:  Diana Oleary MD    Completed ALEXIS GARCIA     Inpatient consult to Social Work/Case Management  Once     Provider:  (Not yet assigned)    AYUSH Patrick        Service: Hospital Medicine    Final Active Diagnoses:    Diagnosis Date Noted POA    PRINCIPAL PROBLEM:  Pulmonary embolus [I26.99] 12/09/2020 Yes    NSTEMI (non-ST elevated myocardial infarction) [I21.4] 12/09/2020 Yes    Special screening examination for unspecified viral disease [Z11.59] 12/09/2020 Not Applicable    BMI 37.0-37.9, adult [Z68.37] 09/19/2017 Not Applicable    Prediabetes [R73.03] 08/18/2017 Yes    Mixed hyperlipidemia [E78.2] 08/18/2017 Yes    " Essential hypertension [I10] 08/17/2017 Yes      Problems Resolved During this Admission:       Discharged Condition: good    Disposition: Home or Self Care    Follow Up:  Follow-up Information     Schedule an appointment as soon as possible for a visit with [facility].    Specialties: Behavioral Health, Psychiatry  Why: FOLLOW UP WITH PCP AFTER [facility]  Contact information:  [place] [address]  [phone]             [facility]. Go on 1/5/2021.    Specialty: [facility]  Why: at 9 am; [facility] AFTER RECENT DISCHARGE  Contact information:  [address], [address]  [place] [address] [phone]  Additional information:  At this time [facility] will only use these entries [facility], [facility], and Emergency Department due to COVID-19 precautions.            [facility] - CARDIOLOGY. Go on 1/6/2021.    Specialty: Cardiology  Why:  at 8 am; FOLLOW UP WITH CARDIOLOGIST AFTER DISCHARGE  Contact information:  [address] [place] [address]  [phone]                 Patient Instructions:      Ambulatory referral/consult to Cardiology   Standing Status: Future   Referral Priority: Routine Referral Type: Consultation   Referral Reason: Specialty Services Required   Requested Specialty: Cardiology   Number of Visits Requested: 1     Diet Cardiac     Diet diabetic     Activity as tolerated       Significant Diagnostic Studies:     Pending Diagnostic Studies:     None         Medications:  Reconciled Home Medications:      Medication List      START taking these medications    apixaban 5 mg Tab  Commonly known as: ELIQUIS  Take 2 tablets (10 mg total) by mouth 2 (two) times daily for 7 days, THEN 1 tablet (5 mg total) 2 (two) times daily for 21 days.  Start taking on: December 12, 2020     aspirin 81 MG EC tablet  Commonly known as: ECOTRIN  Take 1 tablet (81 mg total) by  mouth once daily.     HYDROcodone-acetaminophen 5-325 mg per tablet  Commonly known as: NORCO  Take 1 tablet by mouth every 6 (six) hours as needed.     metoprolol succinate 25 MG 24 hr tablet  Commonly known as: TOPROL-XL  Take 1 tablet (25 mg total) by mouth once daily.        CONTINUE taking these medications    atorvastatin 40 MG tablet  Commonly known as: LIPITOR  Take 1 tablet (40 mg total) by mouth once daily.     blood pressure monitor Kit  Commonly known as: BLOOD PRESSURE KIT  1 each by Misc.(Non-Drug; Combo Route) route once daily.     chlorthalidone 25 MG Tab  Commonly known as: HYGROTEN  Take 1 tablet (25 mg total) by mouth once daily.     losartan 50 MG tablet  Commonly known as: COZAAR  Take 50 mg by mouth once daily.     metFORMIN 500 MG tablet  Commonly known as: GLUCOPHAGE  Take 1 tablet (500 mg total) by mouth daily with breakfast.            Indwelling Lines/Drains at time of discharge:   Lines/Drains/Airways     None                 Time spent on the discharge of patient: 40 minutes  Patient was seen and examined on the date of discharge and determined to be suitable for discharge.         Ciera Maki MD  Department of Hospital Medicine  [facility]

## 2020-12-12 NOTE — PLAN OF CARE
Discharge orders noted, no HH or HME ordered.    Future Appointments   Date Time Provider Department Center   1/5/2021  9:00 AM Juanis Salcedo MD [facility]   1/6/2021  8:30 AM Levi Reardon MD [facility]  [facility]       Pt's nurse will go over medications/signs and symptoms prior to discharge       12/12/20 0918   Final Note   Assessment Type Final Discharge Note   Anticipated Discharge Disposition Home   What phone number can be called within the next 1-3 days to see how you are doing after discharge? [phone]   Hospital Follow Up  Appt(s) scheduled? Yes   Right Care Referral Info   Post Acute Recommendation No Care     Jael Steward, RN Transitional Navigator  [phone]

## 2020-12-12 NOTE — PLAN OF CARE
VN reviewed discharge instructions with pt.  AVS printed and handed to pt by bedside nurse.  Reviewed follow-up appointments, medications, diet, and importance of medication compliance.  Reviewed home care instructions, treatment plan, self-management, and when to seek medical attention.  Allowed time for questions.  Dr aguilar notified pt did not have printed RX for norco. Stated she will reprint.  Bedside nurse notified.All questions answered.  Patient verbalized complete understanding of discharge instructions and voices no concerns.     Discharge instructions complete. .   Bedside nurse notified.

## 2020-12-12 NOTE — PLAN OF CARE
Pt laying in bed, AAOx4. Complains of pain 6/10 in hips, PRN hydrocodone given with noted relief. No signs of distress noted. Accu checks Q6, NSR on tele. Bed locked and in lowest position with call light within reach, bed alarm set. Instructed to call for assistance if needed.

## 2020-12-12 NOTE — PLAN OF CARE
Patient on RA, no respiratory distress noted. Patient states that he will not wear hospital cpap tonight because he did not like the way it felt last night. Patient encouraged to try perhaps with different settings; however, he declines. Will continue to monitor.

## 2020-12-12 NOTE — NURSING
18 gauge to right A/C removed without difficulty, catheter intact- no redness or swelling noted at time of removal. 20 gauge to left hand removed without difficulty, catheter intact- no redness or swelling noted at time of removal. Tele monitor removed, cleaned, and returned to tele bunker. D/C instructions and medications reviewed with patient per JOLENE Parry- verbalizes understanding. Printed Norco prescription given to patient. NADN at time of D/C.

## 2020-12-15 ENCOUNTER — PATIENT OUTREACH (OUTPATIENT)
Dept: ADMINISTRATIVE | Facility: CLINIC | Age: 35
End: 2020-12-15

## 2020-12-15 NOTE — PATIENT INSTRUCTIONS
Apixaban oral tablets  What is this medicine?  APIXABAN (a PIX a ban) is an anticoagulant (blood thinner). It is used to lower the chance of stroke in people with a medical condition called atrial fibrillation. It is also used to treat or prevent blood clots in the lungs or in the veins.  How should I use this medicine?  Take this medicine by mouth with a glass of water. Follow the directions on the prescription label. You can take it with or without food. If it upsets your stomach, take it with food. Take your medicine at regular intervals. Do not take it more often than directed. Do not stop taking except on your doctor's advice. Stopping this medicine may increase your risk of a blot clot. Be sure to refill your prescription before you run out of medicine.  Talk to your pediatrician regarding the use of this medicine in children. Special care may be needed.  What side effects may I notice from receiving this medicine?  Side effects that you should report to your doctor or health care professional as soon as possible:  · allergic reactions like skin rash, itching or hives, swelling of the face, lips, or tongue  · signs and symptoms of bleeding such as bloody or black, tarry stools; red or dark-brown urine; spitting up blood or brown material that looks like coffee grounds; red spots on the skin; unusual bruising or bleeding from the eye, gums, or nose  What may interact with this medicine?  This medicine may interact with the following:  · aspirin and aspirin-like medicines  · certain medicines for fungal infections like ketoconazole and itraconazole  · certain medicines for seizures like carbamazepine and phenytoin  · certain medicines that treat or prevent blood clots like warfarin, enoxaparin, and dalteparin  · clarithromycin  · NSAIDs, medicines for pain and inflammation, like ibuprofen or naproxen  · rifampin  · ritonavir  · McLaughlin's wort  What if I miss a dose?  If you miss a dose, take it as soon as you  can. If it is almost time for your next dose, take only that dose. Do not take double or extra doses.  Where should I keep my medicine?  Keep out of the reach of children.  Store at room temperature between 20 and 25 degrees C (68 and 77 degrees F). Throw away any unused medicine after the expiration date.  What should I tell my health care provider before I take this medicine?  They need to know if you have any of these conditions:  · bleeding disorders  · bleeding in the brain  · blood in your stools (black or tarry stools) or if you have blood in your vomit  · history of stomach bleeding  · kidney disease  · liver disease  · mechanical heart valve  · an unusual or allergic reaction to apixaban, other medicines, foods, dyes, or preservatives  · pregnant or trying to get pregnant  · breast-feeding  What should I watch for while using this medicine?  Notify your doctor or health care professional and seek emergency treatment if you develop breathing problems; changes in vision; chest pain; severe, sudden headache; pain, swelling, warmth in the leg; trouble speaking; sudden numbness or weakness of the face, arm, or leg. These can be signs that your condition has gotten worse.  If you are going to have surgery, tell your doctor or health care professional that you are taking this medicine.  Tell your health care professional that you use this medicine before you have a spinal or epidural procedure. Sometimes people who take this medicine have bleeding problems around the spine when they have a spinal or epidural procedure. This bleeding is very rare. If you have a spinal or epidural procedure while on this medicine, call your health care professional immediately if you have back pain, numbness or tingling (especially in your legs and feet), muscle weakness, paralysis, or loss of bladder or bowel control.  Avoid sports and activities that might cause injury while you are using this medicine. Severe falls or injuries  can cause unseen bleeding. Be careful when using sharp tools or knives. Consider using an electric razor. Take special care brushing or flossing your teeth. Report any injuries, bruising, or red spots on the skin to your doctor or health care professional.  NOTE:This sheet is a summary. It may not cover all possible information. If you have questions about this medicine, talk to your doctor, pharmacist, or health care provider. Copyright© 2017 [employer]

## 2021-01-05 ENCOUNTER — OFFICE VISIT (OUTPATIENT)
Dept: PRIMARY CARE CLINIC | Facility: CLINIC | Age: 36
End: 2021-01-05
Payer: MEDICAID

## 2021-01-05 VITALS
SYSTOLIC BLOOD PRESSURE: 135 MMHG | DIASTOLIC BLOOD PRESSURE: 87 MMHG | BODY MASS INDEX: 40.52 KG/M2 | WEIGHT: 307.13 LBS | OXYGEN SATURATION: 96 % | TEMPERATURE: 98 F | HEART RATE: 78 BPM

## 2021-01-05 DIAGNOSIS — I21.4 NSTEMI (NON-ST ELEVATED MYOCARDIAL INFARCTION): ICD-10-CM

## 2021-01-05 DIAGNOSIS — E11.9 TYPE 2 DIABETES MELLITUS WITHOUT COMPLICATION, WITHOUT LONG-TERM CURRENT USE OF INSULIN: ICD-10-CM

## 2021-01-05 DIAGNOSIS — I26.99 PULMONARY EMBOLISM, UNSPECIFIED CHRONICITY, UNSPECIFIED PULMONARY EMBOLISM TYPE, UNSPECIFIED WHETHER ACUTE COR PULMONALE PRESENT: Primary | ICD-10-CM

## 2021-01-05 DIAGNOSIS — I10 ESSENTIAL HYPERTENSION: ICD-10-CM

## 2021-01-05 DIAGNOSIS — Z72.0 TOBACCO ABUSE: ICD-10-CM

## 2021-01-05 DIAGNOSIS — Z79.01 ANTICOAGULATED: ICD-10-CM

## 2021-01-05 DIAGNOSIS — I82.411 ACUTE DEEP VEIN THROMBOSIS (DVT) OF FEMORAL VEIN OF RIGHT LOWER EXTREMITY: ICD-10-CM

## 2021-01-05 PROBLEM — S61.217A LACERATION OF LEFT LITTLE FINGER WITHOUT FOREIGN BODY WITHOUT DAMAGE TO NAIL: Status: RESOLVED | Noted: 2019-12-23 | Resolved: 2021-01-05

## 2021-01-05 PROBLEM — Z11.59 SPECIAL SCREENING EXAMINATION FOR UNSPECIFIED VIRAL DISEASE: Status: RESOLVED | Noted: 2020-12-09 | Resolved: 2021-01-05

## 2021-01-05 PROBLEM — T14.8XXA WOUND INFECTION: Status: RESOLVED | Noted: 2019-12-23 | Resolved: 2021-01-05

## 2021-01-05 PROBLEM — I82.4Y9 ACUTE DEEP VEIN THROMBOSIS (DVT) OF PROXIMAL VEIN OF LOWER EXTREMITY: Status: ACTIVE | Noted: 2021-01-05

## 2021-01-05 PROBLEM — L08.9 WOUND INFECTION: Status: RESOLVED | Noted: 2019-12-23 | Resolved: 2021-01-05

## 2021-01-05 PROCEDURE — 99205 PR OFFICE/OUTPT VISIT, NEW, LEVL V, 60-74 MIN: ICD-10-PCS | Mod: S$PBB,,, | Performed by: INTERNAL MEDICINE

## 2021-01-05 PROCEDURE — 99205 OFFICE O/P NEW HI 60 MIN: CPT | Mod: S$PBB,,, | Performed by: INTERNAL MEDICINE

## 2021-01-05 PROCEDURE — 99999 PR PBB SHADOW E&M-EST. PATIENT-LVL III: CPT | Mod: PBBFAC,,, | Performed by: INTERNAL MEDICINE

## 2021-01-05 PROCEDURE — 99999 PR PBB SHADOW E&M-EST. PATIENT-LVL III: ICD-10-PCS | Mod: PBBFAC,,, | Performed by: INTERNAL MEDICINE

## 2021-01-05 PROCEDURE — 99213 OFFICE O/P EST LOW 20 MIN: CPT | Mod: PBBFAC,PO | Performed by: INTERNAL MEDICINE

## 2021-01-05 RX ORDER — ASPIRIN 81 MG/1
81 TABLET ORAL DAILY
Qty: 90 TABLET | Refills: 3 | Status: SHIPPED | OUTPATIENT
Start: 2021-01-05 | End: 2022-01-05

## 2021-01-05 RX ORDER — METOPROLOL SUCCINATE 25 MG/1
25 TABLET, EXTENDED RELEASE ORAL DAILY
Qty: 30 TABLET | Refills: 11 | Status: SHIPPED | OUTPATIENT
Start: 2021-01-05 | End: 2022-01-05

## 2021-01-05 RX ORDER — HYDROCODONE BITARTRATE AND ACETAMINOPHEN 5; 325 MG/1; MG/1
1 TABLET ORAL EVERY 6 HOURS PRN
Qty: 10 TABLET | Refills: 0 | Status: ON HOLD | OUTPATIENT
Start: 2021-01-05 | End: 2021-05-20 | Stop reason: HOSPADM

## 2021-01-05 RX ORDER — CHLORTHALIDONE 25 MG/1
25 TABLET ORAL DAILY
Qty: 30 TABLET | Refills: 11 | Status: SHIPPED | OUTPATIENT
Start: 2021-01-05 | End: 2021-01-21

## 2021-01-07 ENCOUNTER — OFFICE VISIT (OUTPATIENT)
Dept: HEMATOLOGY/ONCOLOGY | Facility: CLINIC | Age: 36
End: 2021-01-07
Payer: MEDICAID

## 2021-01-07 VITALS
WEIGHT: 305.13 LBS | BODY MASS INDEX: 40.26 KG/M2 | HEART RATE: 81 BPM | RESPIRATION RATE: 18 BRPM | SYSTOLIC BLOOD PRESSURE: 129 MMHG | TEMPERATURE: 98 F | DIASTOLIC BLOOD PRESSURE: 82 MMHG | OXYGEN SATURATION: 98 %

## 2021-01-07 DIAGNOSIS — I26.99 PULMONARY EMBOLISM, UNSPECIFIED CHRONICITY, UNSPECIFIED PULMONARY EMBOLISM TYPE, UNSPECIFIED WHETHER ACUTE COR PULMONALE PRESENT: ICD-10-CM

## 2021-01-07 PROCEDURE — 99205 OFFICE O/P NEW HI 60 MIN: CPT | Mod: S$PBB,,, | Performed by: INTERNAL MEDICINE

## 2021-01-07 PROCEDURE — 99205 PR OFFICE/OUTPT VISIT, NEW, LEVL V, 60-74 MIN: ICD-10-PCS | Mod: S$PBB,,, | Performed by: INTERNAL MEDICINE

## 2021-01-07 PROCEDURE — 99213 OFFICE O/P EST LOW 20 MIN: CPT | Mod: PBBFAC,PO | Performed by: INTERNAL MEDICINE

## 2021-01-07 PROCEDURE — 99999 PR PBB SHADOW E&M-EST. PATIENT-LVL III: CPT | Mod: PBBFAC,,, | Performed by: INTERNAL MEDICINE

## 2021-01-07 PROCEDURE — 99999 PR PBB SHADOW E&M-EST. PATIENT-LVL III: ICD-10-PCS | Mod: PBBFAC,,, | Performed by: INTERNAL MEDICINE

## 2021-01-12 ENCOUNTER — OFFICE VISIT (OUTPATIENT)
Dept: CARDIOLOGY | Facility: CLINIC | Age: 36
End: 2021-01-12
Payer: MEDICAID

## 2021-01-12 VITALS
WEIGHT: 313.31 LBS | HEART RATE: 80 BPM | BODY MASS INDEX: 42.43 KG/M2 | DIASTOLIC BLOOD PRESSURE: 90 MMHG | SYSTOLIC BLOOD PRESSURE: 138 MMHG | HEIGHT: 72 IN

## 2021-01-12 DIAGNOSIS — E78.2 MIXED HYPERLIPIDEMIA: ICD-10-CM

## 2021-01-12 DIAGNOSIS — I10 ESSENTIAL HYPERTENSION: Primary | ICD-10-CM

## 2021-01-12 DIAGNOSIS — I26.92 SADDLE EMBOLUS OF PULMONARY ARTERY, UNSPECIFIED CHRONICITY, UNSPECIFIED WHETHER ACUTE COR PULMONALE PRESENT: ICD-10-CM

## 2021-01-12 DIAGNOSIS — I82.411 ACUTE DEEP VEIN THROMBOSIS (DVT) OF FEMORAL VEIN OF RIGHT LOWER EXTREMITY: ICD-10-CM

## 2021-01-12 DIAGNOSIS — I21.4 NSTEMI (NON-ST ELEVATED MYOCARDIAL INFARCTION): ICD-10-CM

## 2021-01-12 DIAGNOSIS — E11.9 TYPE 2 DIABETES MELLITUS WITHOUT COMPLICATION, WITHOUT LONG-TERM CURRENT USE OF INSULIN: ICD-10-CM

## 2021-01-12 PROCEDURE — 99214 PR OFFICE/OUTPT VISIT, EST, LEVL IV, 30-39 MIN: ICD-10-PCS | Mod: S$PBB,,, | Performed by: INTERNAL MEDICINE

## 2021-01-12 PROCEDURE — 99213 OFFICE O/P EST LOW 20 MIN: CPT | Mod: PBBFAC,PN | Performed by: INTERNAL MEDICINE

## 2021-01-12 PROCEDURE — 99214 OFFICE O/P EST MOD 30 MIN: CPT | Mod: S$PBB,,, | Performed by: INTERNAL MEDICINE

## 2021-01-12 PROCEDURE — 99999 PR PBB SHADOW E&M-EST. PATIENT-LVL III: CPT | Mod: PBBFAC,,, | Performed by: INTERNAL MEDICINE

## 2021-01-12 PROCEDURE — 99999 PR PBB SHADOW E&M-EST. PATIENT-LVL III: ICD-10-PCS | Mod: PBBFAC,,, | Performed by: INTERNAL MEDICINE

## 2021-01-21 ENCOUNTER — OFFICE VISIT (OUTPATIENT)
Dept: PRIMARY CARE CLINIC | Facility: CLINIC | Age: 36
End: 2021-01-21
Payer: MEDICAID

## 2021-01-21 VITALS
DIASTOLIC BLOOD PRESSURE: 84 MMHG | OXYGEN SATURATION: 96 % | SYSTOLIC BLOOD PRESSURE: 128 MMHG | BODY MASS INDEX: 42.51 KG/M2 | WEIGHT: 313.5 LBS | HEART RATE: 78 BPM | TEMPERATURE: 98 F

## 2021-01-21 DIAGNOSIS — I10 ESSENTIAL HYPERTENSION: ICD-10-CM

## 2021-01-21 DIAGNOSIS — E66.01 CLASS 3 SEVERE OBESITY DUE TO EXCESS CALORIES WITH SERIOUS COMORBIDITY AND BODY MASS INDEX (BMI) OF 40.0 TO 44.9 IN ADULT: ICD-10-CM

## 2021-01-21 DIAGNOSIS — Z79.01 ANTICOAGULATED: ICD-10-CM

## 2021-01-21 DIAGNOSIS — Z72.0 TOBACCO ABUSE: ICD-10-CM

## 2021-01-21 DIAGNOSIS — I82.411 ACUTE DEEP VEIN THROMBOSIS (DVT) OF FEMORAL VEIN OF RIGHT LOWER EXTREMITY: ICD-10-CM

## 2021-01-21 DIAGNOSIS — E11.9 TYPE 2 DIABETES MELLITUS WITHOUT COMPLICATION, WITHOUT LONG-TERM CURRENT USE OF INSULIN: ICD-10-CM

## 2021-01-21 DIAGNOSIS — I26.99 PULMONARY EMBOLISM, OTHER, UNSPECIFIED CHRONICITY, UNSPECIFIED WHETHER ACUTE COR PULMONALE PRESENT: Primary | ICD-10-CM

## 2021-01-21 DIAGNOSIS — I21.4 NSTEMI (NON-ST ELEVATED MYOCARDIAL INFARCTION): ICD-10-CM

## 2021-01-21 DIAGNOSIS — E78.2 MIXED HYPERLIPIDEMIA: ICD-10-CM

## 2021-01-21 PROCEDURE — 99214 OFFICE O/P EST MOD 30 MIN: CPT | Mod: S$PBB,,, | Performed by: INTERNAL MEDICINE

## 2021-01-21 PROCEDURE — 99999 PR PBB SHADOW E&M-EST. PATIENT-LVL III: ICD-10-PCS | Mod: PBBFAC,,, | Performed by: INTERNAL MEDICINE

## 2021-01-21 PROCEDURE — 99999 PR PBB SHADOW E&M-EST. PATIENT-LVL III: CPT | Mod: PBBFAC,,, | Performed by: INTERNAL MEDICINE

## 2021-01-21 PROCEDURE — 99214 PR OFFICE/OUTPT VISIT, EST, LEVL IV, 30-39 MIN: ICD-10-PCS | Mod: S$PBB,,, | Performed by: INTERNAL MEDICINE

## 2021-01-21 PROCEDURE — 99213 OFFICE O/P EST LOW 20 MIN: CPT | Mod: PBBFAC,PO | Performed by: INTERNAL MEDICINE

## 2021-01-21 RX ORDER — ATORVASTATIN CALCIUM 40 MG/1
40 TABLET, FILM COATED ORAL DAILY
Qty: 90 TABLET | Refills: 3 | Status: SHIPPED | OUTPATIENT
Start: 2021-01-21 | End: 2022-01-21

## 2021-01-21 RX ORDER — METFORMIN HYDROCHLORIDE 500 MG/1
500 TABLET ORAL
Qty: 90 TABLET | Refills: 3 | Status: SHIPPED | OUTPATIENT
Start: 2021-01-21 | End: 2022-01-21

## 2021-02-09 ENCOUNTER — OFFICE VISIT (OUTPATIENT)
Dept: CARDIOLOGY | Facility: CLINIC | Age: 36
End: 2021-02-09
Payer: MEDICAID

## 2021-02-09 VITALS
BODY MASS INDEX: 42.66 KG/M2 | SYSTOLIC BLOOD PRESSURE: 139 MMHG | OXYGEN SATURATION: 98 % | HEART RATE: 86 BPM | HEIGHT: 72 IN | WEIGHT: 315 LBS | DIASTOLIC BLOOD PRESSURE: 84 MMHG

## 2021-02-09 DIAGNOSIS — E78.2 MIXED HYPERLIPIDEMIA: ICD-10-CM

## 2021-02-09 DIAGNOSIS — I82.411 ACUTE DEEP VEIN THROMBOSIS (DVT) OF FEMORAL VEIN OF RIGHT LOWER EXTREMITY: ICD-10-CM

## 2021-02-09 DIAGNOSIS — I10 ESSENTIAL HYPERTENSION: ICD-10-CM

## 2021-02-09 DIAGNOSIS — E11.9 TYPE 2 DIABETES MELLITUS WITHOUT COMPLICATION, WITHOUT LONG-TERM CURRENT USE OF INSULIN: ICD-10-CM

## 2021-02-09 DIAGNOSIS — I26.92 SADDLE EMBOLUS OF PULMONARY ARTERY, UNSPECIFIED CHRONICITY, UNSPECIFIED WHETHER ACUTE COR PULMONALE PRESENT: ICD-10-CM

## 2021-02-09 PROCEDURE — 99999 PR PBB SHADOW E&M-EST. PATIENT-LVL III: ICD-10-PCS | Mod: PBBFAC,,, | Performed by: INTERNAL MEDICINE

## 2021-02-09 PROCEDURE — 99214 PR OFFICE/OUTPT VISIT, EST, LEVL IV, 30-39 MIN: ICD-10-PCS | Mod: S$PBB,,, | Performed by: INTERNAL MEDICINE

## 2021-02-09 PROCEDURE — 99999 PR PBB SHADOW E&M-EST. PATIENT-LVL III: CPT | Mod: PBBFAC,,, | Performed by: INTERNAL MEDICINE

## 2021-02-09 PROCEDURE — 99214 OFFICE O/P EST MOD 30 MIN: CPT | Mod: S$PBB,,, | Performed by: INTERNAL MEDICINE

## 2021-02-09 PROCEDURE — 99213 OFFICE O/P EST LOW 20 MIN: CPT | Mod: PBBFAC,PN | Performed by: INTERNAL MEDICINE

## 2021-02-09 RX ORDER — APIXABAN 5 MG/1
TABLET, FILM COATED ORAL
Status: ON HOLD | COMMUNITY
Start: 2021-02-07 | End: 2021-05-20 | Stop reason: SDUPTHER

## 2021-03-17 ENCOUNTER — TELEPHONE (OUTPATIENT)
Dept: CARDIOLOGY | Facility: CLINIC | Age: 36
End: 2021-03-17

## 2021-03-17 ENCOUNTER — HOSPITAL ENCOUNTER (EMERGENCY)
Facility: HOSPITAL | Age: 36
Discharge: HOME | End: 2021-03-17
Attending: EMERGENCY MEDICINE
Payer: MEDICAID

## 2021-03-17 VITALS
SYSTOLIC BLOOD PRESSURE: 139 MMHG | HEIGHT: 73 IN | DIASTOLIC BLOOD PRESSURE: 89 MMHG | TEMPERATURE: 98 F | WEIGHT: 315 LBS | OXYGEN SATURATION: 100 % | RESPIRATION RATE: 18 BRPM | BODY MASS INDEX: 41.75 KG/M2 | HEART RATE: 77 BPM

## 2021-03-17 DIAGNOSIS — M79.602 LEFT ARM PAIN: Primary | ICD-10-CM

## 2021-03-17 PROCEDURE — 99284 EMERGENCY DEPT VISIT MOD MDM: CPT | Mod: 25

## 2021-05-19 ENCOUNTER — TELEPHONE (OUTPATIENT)
Dept: CARDIOLOGY | Facility: CLINIC | Age: 36
End: 2021-05-19

## 2021-05-19 ENCOUNTER — TELEPHONE (OUTPATIENT)
Dept: PRIMARY CARE CLINIC | Facility: CLINIC | Age: 36
End: 2021-05-19

## 2021-05-19 ENCOUNTER — NURSE TRIAGE (OUTPATIENT)
Dept: ADMINISTRATIVE | Facility: CLINIC | Age: 36
End: 2021-05-19

## 2021-05-19 ENCOUNTER — HOSPITAL ENCOUNTER (OUTPATIENT)
Facility: HOSPITAL | Age: 36
Discharge: HOME | End: 2021-05-20
Attending: EMERGENCY MEDICINE | Admitting: FAMILY MEDICINE
Payer: MEDICAID

## 2021-05-19 DIAGNOSIS — M79.89 LEG SWELLING: ICD-10-CM

## 2021-05-19 DIAGNOSIS — I82.409 DVT (DEEP VENOUS THROMBOSIS): Primary | ICD-10-CM

## 2021-05-19 DIAGNOSIS — I82.511 CHRONIC DEEP VEIN THROMBOSIS (DVT) OF FEMORAL VEIN OF RIGHT LOWER EXTREMITY: ICD-10-CM

## 2021-05-19 DIAGNOSIS — R07.9 CHEST PAIN: ICD-10-CM

## 2021-05-19 DIAGNOSIS — R06.02 SHORTNESS OF BREATH: ICD-10-CM

## 2021-05-19 DIAGNOSIS — I10 ESSENTIAL HYPERTENSION: ICD-10-CM

## 2021-05-19 PROBLEM — G47.30 SLEEP APNEA: Status: ACTIVE | Noted: 2021-05-19

## 2021-05-19 LAB
ALBUMIN SERPL BCP-MCNC: 4.2 G/DL (ref 3.5–5.2)
ALP SERPL-CCNC: 75 U/L (ref 55–135)
ALT SERPL W/O P-5'-P-CCNC: 23 U/L (ref 10–44)
ANION GAP SERPL CALC-SCNC: 11 MMOL/L (ref 8–16)
APTT BLDCRRT: 24.5 SEC (ref 21–32)
AST SERPL-CCNC: 23 U/L (ref 10–40)
BASOPHILS # BLD AUTO: 0.02 K/UL (ref 0–0.2)
BASOPHILS NFR BLD: 0.2 % (ref 0–1.9)
BILIRUB SERPL-MCNC: 0.7 MG/DL (ref 0.1–1)
BILIRUB UR QL STRIP: NEGATIVE
BNP SERPL-MCNC: <10 PG/ML (ref 0–99)
BUN SERPL-MCNC: 11 MG/DL (ref 6–20)
CALCIUM SERPL-MCNC: 9.4 MG/DL (ref 8.7–10.5)
CHLORIDE SERPL-SCNC: 106 MMOL/L (ref 95–110)
CLARITY UR: CLEAR
CO2 SERPL-SCNC: 23 MMOL/L (ref 23–29)
COLOR UR: YELLOW
CREAT SERPL-MCNC: 1.3 MG/DL (ref 0.5–1.4)
CTP QC/QA: YES
D DIMER PPP IA.FEU-MCNC: <0.19 MG/L FEU
DIFFERENTIAL METHOD: ABNORMAL
EOSINOPHIL # BLD AUTO: 0.1 K/UL (ref 0–0.5)
EOSINOPHIL NFR BLD: 0.6 % (ref 0–8)
ERYTHROCYTE [DISTWIDTH] IN BLOOD BY AUTOMATED COUNT: 15.4 % (ref 11.5–14.5)
EST. GFR  (AFRICAN AMERICAN): >60 ML/MIN/1.73 M^2
EST. GFR  (NON AFRICAN AMERICAN): >60 ML/MIN/1.73 M^2
GLUCOSE SERPL-MCNC: 114 MG/DL (ref 70–110)
GLUCOSE SERPL-MCNC: 158 MG/DL (ref 70–110)
GLUCOSE UR QL STRIP: NEGATIVE
HCT VFR BLD AUTO: 42.8 % (ref 40–54)
HGB BLD-MCNC: 13.9 G/DL (ref 14–18)
HGB UR QL STRIP: ABNORMAL
IMM GRANULOCYTES # BLD AUTO: 0.03 K/UL (ref 0–0.04)
IMM GRANULOCYTES NFR BLD AUTO: 0.4 % (ref 0–0.5)
INR PPP: 1 (ref 0.8–1.2)
KETONES UR QL STRIP: NEGATIVE
LEUKOCYTE ESTERASE UR QL STRIP: NEGATIVE
LYMPHOCYTES # BLD AUTO: 2.6 K/UL (ref 1–4.8)
LYMPHOCYTES NFR BLD: 32 % (ref 18–48)
MCH RBC QN AUTO: 26.5 PG (ref 27–31)
MCHC RBC AUTO-ENTMCNC: 32.5 G/DL (ref 32–36)
MCV RBC AUTO: 82 FL (ref 82–98)
MONOCYTES # BLD AUTO: 0.3 K/UL (ref 0.3–1)
MONOCYTES NFR BLD: 4.2 % (ref 4–15)
NEUTROPHILS # BLD AUTO: 5 K/UL (ref 1.8–7.7)
NEUTROPHILS NFR BLD: 62.6 % (ref 38–73)
NITRITE UR QL STRIP: NEGATIVE
NRBC BLD-RTO: 0 /100 WBC
PH UR STRIP: 6 [PH] (ref 5–8)
PLATELET # BLD AUTO: 269 K/UL (ref 150–450)
PMV BLD AUTO: 9.3 FL (ref 9.2–12.9)
POCT GLUCOSE: 158 MG/DL (ref 70–110)
POTASSIUM SERPL-SCNC: 3.9 MMOL/L (ref 3.5–5.1)
PROT SERPL-MCNC: 8.2 G/DL (ref 6–8.4)
PROT UR QL STRIP: ABNORMAL
PROTHROMBIN TIME: 10.7 SEC (ref 9–12.5)
RBC # BLD AUTO: 5.25 M/UL (ref 4.6–6.2)
SARS-COV-2 RDRP RESP QL NAA+PROBE: NEGATIVE
SODIUM SERPL-SCNC: 140 MMOL/L (ref 136–145)
SP GR UR STRIP: >1.03 (ref 1–1.03)
TROPONIN I SERPL DL<=0.01 NG/ML-MCNC: <0.006 NG/ML (ref 0–0.03)
URN SPEC COLLECT METH UR: ABNORMAL
UROBILINOGEN UR STRIP-ACNC: NEGATIVE EU/DL
WBC # BLD AUTO: 8.05 K/UL (ref 3.9–12.7)

## 2021-05-19 PROCEDURE — U0002 COVID-19 LAB TEST NON-CDC: HCPCS | Performed by: NURSE PRACTITIONER

## 2021-05-19 PROCEDURE — G0378 HOSPITAL OBSERVATION PER HR: HCPCS

## 2021-05-19 PROCEDURE — 25500020 PHARM REV CODE 255: Performed by: EMERGENCY MEDICINE

## 2021-05-19 PROCEDURE — 11000001 HC ACUTE MED/SURG PRIVATE ROOM

## 2021-05-19 PROCEDURE — 85379 FIBRIN DEGRADATION QUANT: CPT | Performed by: NURSE PRACTITIONER

## 2021-05-19 PROCEDURE — 83880 ASSAY OF NATRIURETIC PEPTIDE: CPT | Performed by: NURSE PRACTITIONER

## 2021-05-19 PROCEDURE — 63600175 PHARM REV CODE 636 W HCPCS: Performed by: NURSE PRACTITIONER

## 2021-05-19 PROCEDURE — 36415 COLL VENOUS BLD VENIPUNCTURE: CPT | Performed by: FAMILY MEDICINE

## 2021-05-19 PROCEDURE — 96365 THER/PROPH/DIAG IV INF INIT: CPT

## 2021-05-19 PROCEDURE — 93005 ELECTROCARDIOGRAM TRACING: CPT

## 2021-05-19 PROCEDURE — 85610 PROTHROMBIN TIME: CPT | Performed by: NURSE PRACTITIONER

## 2021-05-19 PROCEDURE — 85025 COMPLETE CBC W/AUTO DIFF WBC: CPT | Performed by: PHYSICIAN ASSISTANT

## 2021-05-19 PROCEDURE — 82962 GLUCOSE BLOOD TEST: CPT

## 2021-05-19 PROCEDURE — 99285 EMERGENCY DEPT VISIT HI MDM: CPT | Mod: 25

## 2021-05-19 PROCEDURE — 96366 THER/PROPH/DIAG IV INF ADDON: CPT

## 2021-05-19 PROCEDURE — 84484 ASSAY OF TROPONIN QUANT: CPT | Performed by: NURSE PRACTITIONER

## 2021-05-19 PROCEDURE — 81003 URINALYSIS AUTO W/O SCOPE: CPT | Performed by: NURSE PRACTITIONER

## 2021-05-19 PROCEDURE — 80053 COMPREHEN METABOLIC PANEL: CPT | Performed by: PHYSICIAN ASSISTANT

## 2021-05-19 PROCEDURE — 93010 EKG 12-LEAD: ICD-10-PCS | Mod: ,,, | Performed by: INTERNAL MEDICINE

## 2021-05-19 PROCEDURE — 85379 FIBRIN DEGRADATION QUANT: CPT

## 2021-05-19 PROCEDURE — 93010 ELECTROCARDIOGRAM REPORT: CPT | Mod: ,,, | Performed by: INTERNAL MEDICINE

## 2021-05-19 PROCEDURE — 85730 THROMBOPLASTIN TIME PARTIAL: CPT | Mod: 91 | Performed by: NURSE PRACTITIONER

## 2021-05-19 PROCEDURE — 85730 THROMBOPLASTIN TIME PARTIAL: CPT | Performed by: FAMILY MEDICINE

## 2021-05-19 RX ORDER — IBUPROFEN 200 MG
16 TABLET ORAL
Status: DISCONTINUED | OUTPATIENT
Start: 2021-05-19 | End: 2021-05-20 | Stop reason: HOSPADM

## 2021-05-19 RX ORDER — ATORVASTATIN CALCIUM 40 MG/1
40 TABLET, FILM COATED ORAL DAILY
Status: DISCONTINUED | OUTPATIENT
Start: 2021-05-20 | End: 2021-05-20 | Stop reason: HOSPADM

## 2021-05-19 RX ORDER — SODIUM CHLORIDE 0.9 % (FLUSH) 0.9 %
10 SYRINGE (ML) INJECTION
Status: DISCONTINUED | OUTPATIENT
Start: 2021-05-19 | End: 2021-05-20 | Stop reason: HOSPADM

## 2021-05-19 RX ORDER — IBUPROFEN 200 MG
24 TABLET ORAL
Status: DISCONTINUED | OUTPATIENT
Start: 2021-05-19 | End: 2021-05-20 | Stop reason: HOSPADM

## 2021-05-19 RX ORDER — GLUCAGON 1 MG
1 KIT INJECTION
Status: DISCONTINUED | OUTPATIENT
Start: 2021-05-19 | End: 2021-05-20 | Stop reason: HOSPADM

## 2021-05-19 RX ORDER — METOPROLOL SUCCINATE 25 MG/1
25 TABLET, EXTENDED RELEASE ORAL DAILY
Status: DISCONTINUED | OUTPATIENT
Start: 2021-05-20 | End: 2021-05-20 | Stop reason: HOSPADM

## 2021-05-19 RX ORDER — LOSARTAN POTASSIUM 50 MG/1
50 TABLET ORAL DAILY
Status: DISCONTINUED | OUTPATIENT
Start: 2021-05-20 | End: 2021-05-20 | Stop reason: HOSPADM

## 2021-05-19 RX ORDER — INSULIN ASPART 100 [IU]/ML
0-5 INJECTION, SOLUTION INTRAVENOUS; SUBCUTANEOUS
Status: DISCONTINUED | OUTPATIENT
Start: 2021-05-19 | End: 2021-05-20 | Stop reason: HOSPADM

## 2021-05-19 RX ORDER — TALC
6 POWDER (GRAM) TOPICAL NIGHTLY PRN
Status: DISCONTINUED | OUTPATIENT
Start: 2021-05-19 | End: 2021-05-20 | Stop reason: HOSPADM

## 2021-05-19 RX ORDER — ACETAMINOPHEN 325 MG/1
650 TABLET ORAL EVERY 4 HOURS PRN
Status: DISCONTINUED | OUTPATIENT
Start: 2021-05-19 | End: 2021-05-20 | Stop reason: HOSPADM

## 2021-05-19 RX ORDER — ONDANSETRON 2 MG/ML
4 INJECTION INTRAMUSCULAR; INTRAVENOUS EVERY 8 HOURS PRN
Status: DISCONTINUED | OUTPATIENT
Start: 2021-05-19 | End: 2021-05-20 | Stop reason: HOSPADM

## 2021-05-19 RX ORDER — HEPARIN SODIUM,PORCINE/D5W 25000/250
18 INTRAVENOUS SOLUTION INTRAVENOUS CONTINUOUS
Status: DISCONTINUED | OUTPATIENT
Start: 2021-05-19 | End: 2021-05-20 | Stop reason: HOSPADM

## 2021-05-19 RX ADMIN — IOHEXOL 100 ML: 350 INJECTION, SOLUTION INTRAVENOUS at 05:05

## 2021-05-19 RX ADMIN — HEPARIN SODIUM 18 UNITS/KG/HR: 10000 INJECTION, SOLUTION INTRAVENOUS at 06:05

## 2021-05-20 ENCOUNTER — TELEPHONE (OUTPATIENT)
Dept: CARDIOLOGY | Facility: CLINIC | Age: 36
End: 2021-05-20

## 2021-05-20 VITALS
OXYGEN SATURATION: 97 % | BODY MASS INDEX: 42.66 KG/M2 | RESPIRATION RATE: 15 BRPM | WEIGHT: 315 LBS | HEIGHT: 72 IN | HEART RATE: 51 BPM | SYSTOLIC BLOOD PRESSURE: 121 MMHG | DIASTOLIC BLOOD PRESSURE: 76 MMHG | TEMPERATURE: 98 F

## 2021-05-20 PROBLEM — M25.561 ACUTE PAIN OF RIGHT KNEE: Status: ACTIVE | Noted: 2021-05-20

## 2021-05-20 PROBLEM — I82.511 CHRONIC DEEP VEIN THROMBOSIS (DVT) OF FEMORAL VEIN OF RIGHT LOWER EXTREMITY: Status: ACTIVE | Noted: 2021-01-05

## 2021-05-20 LAB
ALBUMIN SERPL BCP-MCNC: 3.6 G/DL (ref 3.5–5.2)
ALP SERPL-CCNC: 67 U/L (ref 55–135)
ALT SERPL W/O P-5'-P-CCNC: 21 U/L (ref 10–44)
ANION GAP SERPL CALC-SCNC: 10 MMOL/L (ref 8–16)
AORTIC ROOT ANNULUS: 3.15 CM
AORTIC VALVE CUSP SEPERATION: 2.46 CM
APTT BLDCRRT: 30.5 SEC (ref 21–32)
APTT BLDCRRT: 83.4 SEC (ref 21–32)
AST SERPL-CCNC: 21 U/L (ref 10–40)
AV INDEX (PROSTH): 1.03
AV MEAN GRADIENT: 2 MMHG
AV PEAK GRADIENT: 4 MMHG
AV VALVE AREA: 3.52 CM2
AV VELOCITY RATIO: 0.84
BASOPHILS # BLD AUTO: 0.01 K/UL (ref 0–0.2)
BASOPHILS NFR BLD: 0.1 % (ref 0–1.9)
BILIRUB SERPL-MCNC: 0.6 MG/DL (ref 0.1–1)
BSA FOR ECHO PROCEDURE: 2.74 M2
BUN SERPL-MCNC: 12 MG/DL (ref 6–20)
CALCIUM SERPL-MCNC: 8.7 MG/DL (ref 8.7–10.5)
CHLORIDE SERPL-SCNC: 106 MMOL/L (ref 95–110)
CO2 SERPL-SCNC: 23 MMOL/L (ref 23–29)
CREAT SERPL-MCNC: 1.3 MG/DL (ref 0.5–1.4)
CV ECHO LV RWT: 0.39 CM
DIFFERENTIAL METHOD: ABNORMAL
DOP CALC AO PEAK VEL: 1.03 M/S
DOP CALC AO VTI: 19.85 CM
DOP CALC LVOT AREA: 3.4 CM2
DOP CALC LVOT DIAMETER: 2.09 CM
DOP CALC LVOT PEAK VEL: 0.87 M/S
DOP CALC LVOT STROKE VOLUME: 69.92 CM3
DOP CALCLVOT PEAK VEL VTI: 20.39 CM
E WAVE DECELERATION TIME: 187.5 MSEC
E/A RATIO: 1.57
E/E' RATIO: 9.43 M/S
ECHO LV POSTERIOR WALL: 0.9 CM (ref 0.6–1.1)
EJECTION FRACTION: 55 %
EOSINOPHIL # BLD AUTO: 0.1 K/UL (ref 0–0.5)
EOSINOPHIL NFR BLD: 1.7 % (ref 0–8)
ERYTHROCYTE [DISTWIDTH] IN BLOOD BY AUTOMATED COUNT: 15.3 % (ref 11.5–14.5)
EST. GFR  (AFRICAN AMERICAN): >60 ML/MIN/1.73 M^2
EST. GFR  (NON AFRICAN AMERICAN): >60 ML/MIN/1.73 M^2
ESTIMATED AVG GLUCOSE: 174 MG/DL (ref 68–131)
FRACTIONAL SHORTENING: 59 % (ref 28–44)
GLUCOSE SERPL-MCNC: 125 MG/DL (ref 70–110)
HBA1C MFR BLD: 7.7 % (ref 4–5.6)
HCT VFR BLD AUTO: 40.8 % (ref 40–54)
HGB BLD-MCNC: 13.2 G/DL (ref 14–18)
IMM GRANULOCYTES # BLD AUTO: 0.01 K/UL (ref 0–0.04)
IMM GRANULOCYTES NFR BLD AUTO: 0.1 % (ref 0–0.5)
INTERVENTRICULAR SEPTUM: 0.9 CM (ref 0.6–1.1)
IVRT: 71.36 MSEC
LA MAJOR: 5 CM
LA MINOR: 5 CM
LA WIDTH: 3.6 CM
LEFT ATRIUM SIZE: 4 CM
LEFT ATRIUM VOLUME INDEX: 23.3 ML/M2
LEFT ATRIUM VOLUME: 61.2 CM3
LEFT INTERNAL DIMENSION IN SYSTOLE: 1.9 CM (ref 2.1–4)
LEFT VENTRICLE DIASTOLIC VOLUME INDEX: 48.63 ML/M2
LEFT VENTRICLE DIASTOLIC VOLUME: 127.91 ML
LEFT VENTRICLE MASS INDEX: 52 G/M2
LEFT VENTRICLE SYSTOLIC VOLUME INDEX: 23.9 ML/M2
LEFT VENTRICLE SYSTOLIC VOLUME: 62.77 ML
LEFT VENTRICULAR INTERNAL DIMENSION IN DIASTOLE: 4.6 CM (ref 3.5–6)
LEFT VENTRICULAR MASS: 137.72 G
LV LATERAL E/E' RATIO: 9.9 M/S
LV SEPTAL E/E' RATIO: 9 M/S
LYMPHOCYTES # BLD AUTO: 3.5 K/UL (ref 1–4.8)
LYMPHOCYTES NFR BLD: 49.1 % (ref 18–48)
MAGNESIUM SERPL-MCNC: 1.7 MG/DL (ref 1.6–2.6)
MCH RBC QN AUTO: 26.5 PG (ref 27–31)
MCHC RBC AUTO-ENTMCNC: 32.4 G/DL (ref 32–36)
MCV RBC AUTO: 82 FL (ref 82–98)
MONOCYTES # BLD AUTO: 0.3 K/UL (ref 0.3–1)
MONOCYTES NFR BLD: 4.3 % (ref 4–15)
MV A" WAVE DURATION": 15.98 MSEC
MV MEAN GRADIENT: 0 MMHG
MV PEAK A VEL: 0.63 M/S
MV PEAK E VEL: 0.99 M/S
MV PEAK GRADIENT: 4 MMHG
MV STENOSIS PRESSURE HALF TIME: 54.38 MS
MV VALVE AREA P 1/2 METHOD: 4.05 CM2
NEUTROPHILS # BLD AUTO: 3.2 K/UL (ref 1.8–7.7)
NEUTROPHILS NFR BLD: 44.7 % (ref 38–73)
NRBC BLD-RTO: 0 /100 WBC
PISA MRMAX VEL: 0.03 M/S
PISA TR MAX VEL: 1.93 M/S
PLATELET # BLD AUTO: 257 K/UL (ref 150–450)
PMV BLD AUTO: 9.8 FL (ref 9.2–12.9)
POCT GLUCOSE: 127 MG/DL (ref 70–110)
POCT GLUCOSE: 132 MG/DL (ref 70–110)
POTASSIUM SERPL-SCNC: 3.9 MMOL/L (ref 3.5–5.1)
PROT SERPL-MCNC: 7 G/DL (ref 6–8.4)
PULM VEIN S/D RATIO: 0.52
PV PEAK D VEL: 0.64 M/S
PV PEAK S VEL: 0.33 M/S
PV PEAK VELOCITY: 0.73 CM/S
RA MAJOR: 3.6 CM
RA PRESSURE: 3 MMHG
RA WIDTH: 3.4 CM
RBC # BLD AUTO: 4.98 M/UL (ref 4.6–6.2)
RIGHT VENTRICULAR END-DIASTOLIC DIMENSION: 3.36 CM
RIGHT VENTRICULAR LENGTH IN DIASTOLE (APICAL 4-CHAMBER VIEW): 6 CM
SODIUM SERPL-SCNC: 139 MMOL/L (ref 136–145)
TDI LATERAL: 0.1 M/S
TDI SEPTAL: 0.11 M/S
TDI: 0.11 M/S
TR MAX PG: 15 MMHG
TRICUSPID ANNULAR PLANE SYSTOLIC EXCURSION: 2.2 CM
TV REST PULMONARY ARTERY PRESSURE: 18 MMHG
WBC # BLD AUTO: 7.15 K/UL (ref 3.9–12.7)

## 2021-05-20 PROCEDURE — 83735 ASSAY OF MAGNESIUM: CPT | Performed by: NURSE PRACTITIONER

## 2021-05-20 PROCEDURE — G0378 HOSPITAL OBSERVATION PER HR: HCPCS

## 2021-05-20 PROCEDURE — 36415 COLL VENOUS BLD VENIPUNCTURE: CPT | Performed by: NURSE PRACTITIONER

## 2021-05-20 PROCEDURE — 99220 PR INITIAL OBSERVATION CARE,LEVL III: ICD-10-PCS | Mod: ,,, | Performed by: INTERNAL MEDICINE

## 2021-05-20 PROCEDURE — 94760 N-INVAS EAR/PLS OXIMETRY 1: CPT

## 2021-05-20 PROCEDURE — 94761 N-INVAS EAR/PLS OXIMETRY MLT: CPT

## 2021-05-20 PROCEDURE — 99900035 HC TECH TIME PER 15 MIN (STAT)

## 2021-05-20 PROCEDURE — 63600175 PHARM REV CODE 636 W HCPCS: Performed by: NURSE PRACTITIONER

## 2021-05-20 PROCEDURE — 80053 COMPREHEN METABOLIC PANEL: CPT | Performed by: NURSE PRACTITIONER

## 2021-05-20 PROCEDURE — 99220 PR INITIAL OBSERVATION CARE,LEVL III: CPT | Mod: ,,, | Performed by: NURSE PRACTITIONER

## 2021-05-20 PROCEDURE — 94660 CPAP INITIATION&MGMT: CPT

## 2021-05-20 PROCEDURE — 99220 PR INITIAL OBSERVATION CARE,LEVL III: ICD-10-PCS | Mod: ,,, | Performed by: NURSE PRACTITIONER

## 2021-05-20 PROCEDURE — 85025 COMPLETE CBC W/AUTO DIFF WBC: CPT | Performed by: NURSE PRACTITIONER

## 2021-05-20 PROCEDURE — 83036 HEMOGLOBIN GLYCOSYLATED A1C: CPT | Performed by: NURSE PRACTITIONER

## 2021-05-20 PROCEDURE — 85730 THROMBOPLASTIN TIME PARTIAL: CPT | Performed by: FAMILY MEDICINE

## 2021-05-20 PROCEDURE — 25000003 PHARM REV CODE 250: Performed by: NURSE PRACTITIONER

## 2021-05-20 PROCEDURE — 27000190 HC CPAP FULL FACE MASK W/VALVE

## 2021-05-20 PROCEDURE — 99220 PR INITIAL OBSERVATION CARE,LEVL III: CPT | Mod: ,,, | Performed by: INTERNAL MEDICINE

## 2021-05-20 RX ORDER — LOSARTAN POTASSIUM 50 MG/1
50 TABLET ORAL DAILY
Start: 2021-05-20 | End: 2021-06-01 | Stop reason: SDUPTHER

## 2021-05-20 RX ORDER — APIXABAN 5 MG/1
5 TABLET, FILM COATED ORAL 2 TIMES DAILY
Start: 2021-05-20 | End: 2021-08-23 | Stop reason: SDUPTHER

## 2021-05-20 RX ORDER — DICLOFENAC SODIUM 10 MG/G
2 GEL TOPICAL 4 TIMES DAILY
Qty: 1 TUBE | Refills: 0 | Status: SHIPPED | OUTPATIENT
Start: 2021-05-20

## 2021-05-20 RX ADMIN — METOPROLOL SUCCINATE 25 MG: 25 TABLET, EXTENDED RELEASE ORAL at 09:05

## 2021-05-20 RX ADMIN — HEPARIN SODIUM 18 UNITS/KG/HR: 10000 INJECTION, SOLUTION INTRAVENOUS at 10:05

## 2021-05-20 RX ADMIN — ATORVASTATIN CALCIUM 40 MG: 40 TABLET, FILM COATED ORAL at 09:05

## 2021-05-20 RX ADMIN — LOSARTAN POTASSIUM 50 MG: 50 TABLET, FILM COATED ORAL at 09:05

## 2021-05-20 RX ADMIN — HEPARIN SODIUM 21 UNITS/KG/HR: 10000 INJECTION, SOLUTION INTRAVENOUS at 04:05

## 2021-06-01 ENCOUNTER — OFFICE VISIT (OUTPATIENT)
Dept: PRIMARY CARE CLINIC | Facility: CLINIC | Age: 36
End: 2021-06-01
Payer: MEDICAID

## 2021-06-01 VITALS
HEART RATE: 73 BPM | OXYGEN SATURATION: 96 % | TEMPERATURE: 98 F | DIASTOLIC BLOOD PRESSURE: 83 MMHG | SYSTOLIC BLOOD PRESSURE: 141 MMHG | BODY MASS INDEX: 43.25 KG/M2 | WEIGHT: 315 LBS

## 2021-06-01 DIAGNOSIS — M25.561 ACUTE PAIN OF RIGHT KNEE: ICD-10-CM

## 2021-06-01 DIAGNOSIS — I82.511 CHRONIC DEEP VEIN THROMBOSIS (DVT) OF FEMORAL VEIN OF RIGHT LOWER EXTREMITY: Primary | ICD-10-CM

## 2021-06-01 DIAGNOSIS — Z20.822 COVID-19 RULED OUT: ICD-10-CM

## 2021-06-01 DIAGNOSIS — Z79.01 CURRENT USE OF LONG TERM ANTICOAGULATION: ICD-10-CM

## 2021-06-01 DIAGNOSIS — R29.898 LEG WEAKNESS, BILATERAL: ICD-10-CM

## 2021-06-01 DIAGNOSIS — E11.9 TYPE 2 DIABETES MELLITUS WITHOUT COMPLICATION, WITHOUT LONG-TERM CURRENT USE OF INSULIN: ICD-10-CM

## 2021-06-01 DIAGNOSIS — E66.01 CLASS 3 SEVERE OBESITY DUE TO EXCESS CALORIES WITH SERIOUS COMORBIDITY AND BODY MASS INDEX (BMI) OF 40.0 TO 44.9 IN ADULT: ICD-10-CM

## 2021-06-01 DIAGNOSIS — I10 ESSENTIAL HYPERTENSION: ICD-10-CM

## 2021-06-01 PROBLEM — R06.02 SHORTNESS OF BREATH: Status: RESOLVED | Noted: 2021-05-19 | Resolved: 2021-06-01

## 2021-06-01 PROCEDURE — 99999 PR PBB SHADOW E&M-EST. PATIENT-LVL V: ICD-10-PCS | Mod: PBBFAC,,, | Performed by: INTERNAL MEDICINE

## 2021-06-01 PROCEDURE — 99215 OFFICE O/P EST HI 40 MIN: CPT | Mod: PBBFAC,PO | Performed by: INTERNAL MEDICINE

## 2021-06-01 PROCEDURE — 99999 PR PBB SHADOW E&M-EST. PATIENT-LVL V: CPT | Mod: PBBFAC,,, | Performed by: INTERNAL MEDICINE

## 2021-06-01 PROCEDURE — 99215 PR OFFICE/OUTPT VISIT, EST, LEVL V, 40-54 MIN: ICD-10-PCS | Mod: S$PBB,,, | Performed by: INTERNAL MEDICINE

## 2021-06-01 PROCEDURE — 99215 OFFICE O/P EST HI 40 MIN: CPT | Mod: S$PBB,,, | Performed by: INTERNAL MEDICINE

## 2021-06-01 RX ORDER — LOSARTAN POTASSIUM 100 MG/1
100 TABLET ORAL DAILY
Qty: 90 TABLET | Refills: 3 | Status: SHIPPED | OUTPATIENT
Start: 2021-06-01 | End: 2022-03-01

## 2021-06-04 ENCOUNTER — OFFICE VISIT (OUTPATIENT)
Dept: CARDIOLOGY | Facility: CLINIC | Age: 36
End: 2021-06-04
Payer: MEDICAID

## 2021-06-04 VITALS
DIASTOLIC BLOOD PRESSURE: 87 MMHG | SYSTOLIC BLOOD PRESSURE: 134 MMHG | HEIGHT: 72 IN | BODY MASS INDEX: 42.66 KG/M2 | HEART RATE: 69 BPM | OXYGEN SATURATION: 97 % | WEIGHT: 315 LBS

## 2021-06-04 DIAGNOSIS — I10 ESSENTIAL HYPERTENSION: ICD-10-CM

## 2021-06-04 DIAGNOSIS — Z79.01 CURRENT USE OF LONG TERM ANTICOAGULATION: ICD-10-CM

## 2021-06-04 DIAGNOSIS — E78.2 MIXED HYPERLIPIDEMIA: ICD-10-CM

## 2021-06-04 DIAGNOSIS — E11.9 TYPE 2 DIABETES MELLITUS WITHOUT COMPLICATION, WITHOUT LONG-TERM CURRENT USE OF INSULIN: ICD-10-CM

## 2021-06-04 DIAGNOSIS — I82.511 CHRONIC DEEP VEIN THROMBOSIS (DVT) OF FEMORAL VEIN OF RIGHT LOWER EXTREMITY: ICD-10-CM

## 2021-06-04 DIAGNOSIS — I21.4 NSTEMI (NON-ST ELEVATED MYOCARDIAL INFARCTION): ICD-10-CM

## 2021-06-04 DIAGNOSIS — I82.409 DVT (DEEP VENOUS THROMBOSIS): ICD-10-CM

## 2021-06-04 DIAGNOSIS — I26.02 SADDLE EMBOLUS OF PULMONARY ARTERY WITH ACUTE COR PULMONALE, UNSPECIFIED CHRONICITY: ICD-10-CM

## 2021-06-04 PROCEDURE — 99999 PR PBB SHADOW E&M-EST. PATIENT-LVL IV: CPT | Mod: PBBFAC,,, | Performed by: INTERNAL MEDICINE

## 2021-06-04 PROCEDURE — 99214 PR OFFICE/OUTPT VISIT, EST, LEVL IV, 30-39 MIN: ICD-10-PCS | Mod: S$PBB,,, | Performed by: INTERNAL MEDICINE

## 2021-06-04 PROCEDURE — 99214 OFFICE O/P EST MOD 30 MIN: CPT | Mod: S$PBB,,, | Performed by: INTERNAL MEDICINE

## 2021-06-04 PROCEDURE — 99999 PR PBB SHADOW E&M-EST. PATIENT-LVL IV: ICD-10-PCS | Mod: PBBFAC,,, | Performed by: INTERNAL MEDICINE

## 2021-06-04 PROCEDURE — 99214 OFFICE O/P EST MOD 30 MIN: CPT | Mod: PBBFAC,PO | Performed by: INTERNAL MEDICINE

## 2021-06-25 ENCOUNTER — TELEPHONE (OUTPATIENT)
Dept: ORTHOPEDICS | Facility: CLINIC | Age: 36
End: 2021-06-25

## 2021-06-25 DIAGNOSIS — M25.561 PAIN IN BOTH KNEES, UNSPECIFIED CHRONICITY: Primary | ICD-10-CM

## 2021-06-25 DIAGNOSIS — M25.562 PAIN IN BOTH KNEES, UNSPECIFIED CHRONICITY: Primary | ICD-10-CM

## 2021-07-19 ENCOUNTER — HOSPITAL ENCOUNTER (OUTPATIENT)
Dept: RADIOLOGY | Facility: HOSPITAL | Age: 36
Discharge: HOME | End: 2021-07-19
Attending: INTERNAL MEDICINE
Payer: MEDICAID

## 2021-07-19 DIAGNOSIS — I26.99 PULMONARY EMBOLISM, UNSPECIFIED CHRONICITY, UNSPECIFIED PULMONARY EMBOLISM TYPE, UNSPECIFIED WHETHER ACUTE COR PULMONALE PRESENT: ICD-10-CM

## 2021-07-19 PROCEDURE — 71275 CT ANGIOGRAPHY CHEST: CPT | Mod: TC

## 2021-07-19 PROCEDURE — 93970 EXTREMITY STUDY: CPT | Mod: TC

## 2021-07-19 PROCEDURE — 93970 US LOWER EXTREMITY VEINS BILATERAL: ICD-10-PCS | Mod: 26,,, | Performed by: RADIOLOGY

## 2021-07-19 PROCEDURE — 71275 CTA CHEST NON CORONARY: ICD-10-PCS | Mod: 26,,, | Performed by: RADIOLOGY

## 2021-07-19 PROCEDURE — 93970 EXTREMITY STUDY: CPT | Mod: 26,,, | Performed by: RADIOLOGY

## 2021-07-19 PROCEDURE — 71275 CT ANGIOGRAPHY CHEST: CPT | Mod: 26,,, | Performed by: RADIOLOGY

## 2021-07-19 PROCEDURE — 25500020 PHARM REV CODE 255: Performed by: INTERNAL MEDICINE

## 2021-07-19 RX ADMIN — IOHEXOL 100 ML: 350 INJECTION, SOLUTION INTRAVENOUS at 11:07

## 2021-07-27 ENCOUNTER — TELEPHONE (OUTPATIENT)
Dept: ADMINISTRATIVE | Facility: HOSPITAL | Age: 36
End: 2021-07-27

## 2021-07-29 ENCOUNTER — TELEPHONE (OUTPATIENT)
Dept: HEMATOLOGY/ONCOLOGY | Facility: CLINIC | Age: 36
End: 2021-07-29

## 2021-08-02 ENCOUNTER — OFFICE VISIT (OUTPATIENT)
Dept: HEMATOLOGY/ONCOLOGY | Facility: CLINIC | Age: 36
End: 2021-08-02
Payer: MEDICAID

## 2021-08-02 VITALS
BODY MASS INDEX: 43.74 KG/M2 | OXYGEN SATURATION: 96 % | HEART RATE: 71 BPM | DIASTOLIC BLOOD PRESSURE: 90 MMHG | SYSTOLIC BLOOD PRESSURE: 142 MMHG | WEIGHT: 315 LBS | RESPIRATION RATE: 18 BRPM

## 2021-08-02 DIAGNOSIS — I82.511 CHRONIC DEEP VEIN THROMBOSIS (DVT) OF FEMORAL VEIN OF RIGHT LOWER EXTREMITY: ICD-10-CM

## 2021-08-02 DIAGNOSIS — I26.99 PULMONARY EMBOLISM, UNSPECIFIED CHRONICITY, UNSPECIFIED PULMONARY EMBOLISM TYPE, UNSPECIFIED WHETHER ACUTE COR PULMONALE PRESENT: Primary | ICD-10-CM

## 2021-08-02 PROCEDURE — 99214 OFFICE O/P EST MOD 30 MIN: CPT | Mod: S$PBB,,, | Performed by: INTERNAL MEDICINE

## 2021-08-02 PROCEDURE — 99214 PR OFFICE/OUTPT VISIT, EST, LEVL IV, 30-39 MIN: ICD-10-PCS | Mod: S$PBB,,, | Performed by: INTERNAL MEDICINE

## 2021-08-02 PROCEDURE — 99213 OFFICE O/P EST LOW 20 MIN: CPT | Mod: PBBFAC,PO | Performed by: INTERNAL MEDICINE

## 2021-08-02 PROCEDURE — 99999 PR PBB SHADOW E&M-EST. PATIENT-LVL III: CPT | Mod: PBBFAC,,, | Performed by: INTERNAL MEDICINE

## 2021-08-02 PROCEDURE — 99999 PR PBB SHADOW E&M-EST. PATIENT-LVL III: ICD-10-PCS | Mod: PBBFAC,,, | Performed by: INTERNAL MEDICINE

## 2021-08-23 DIAGNOSIS — I26.99 PULMONARY EMBOLISM, UNSPECIFIED CHRONICITY, UNSPECIFIED PULMONARY EMBOLISM TYPE, UNSPECIFIED WHETHER ACUTE COR PULMONALE PRESENT: Primary | ICD-10-CM

## 2021-08-23 RX ORDER — APIXABAN 5 MG/1
5 TABLET, FILM COATED ORAL 2 TIMES DAILY
Qty: 270 TABLET | Refills: 3
Start: 2021-08-23 | End: 2022-03-28

## 2021-08-25 DIAGNOSIS — I26.99 PULMONARY EMBOLISM, UNSPECIFIED CHRONICITY, UNSPECIFIED PULMONARY EMBOLISM TYPE, UNSPECIFIED WHETHER ACUTE COR PULMONALE PRESENT: ICD-10-CM

## 2021-08-25 RX ORDER — APIXABAN 5 MG/1
5 TABLET, FILM COATED ORAL 2 TIMES DAILY
Qty: 270 TABLET | Refills: 3 | Status: CANCELLED
Start: 2021-08-25 | End: 2022-08-25

## 2021-08-27 ENCOUNTER — TELEPHONE (OUTPATIENT)
Dept: HEMATOLOGY/ONCOLOGY | Facility: CLINIC | Age: 36
End: 2021-08-27

## 2021-09-15 ENCOUNTER — TELEPHONE (OUTPATIENT)
Dept: ADMINISTRATIVE | Facility: HOSPITAL | Age: 36
End: 2021-09-15

## 2021-10-01 ENCOUNTER — TELEPHONE (OUTPATIENT)
Dept: OPTOMETRY | Facility: CLINIC | Age: 36
End: 2021-10-01

## 2021-12-05 ENCOUNTER — HOSPITAL ENCOUNTER (EMERGENCY)
Facility: HOSPITAL | Age: 36
Discharge: HOME | End: 2021-12-06
Attending: EMERGENCY MEDICINE
Payer: MEDICAID

## 2021-12-05 DIAGNOSIS — R00.2 PALPITATIONS: Primary | ICD-10-CM

## 2021-12-05 DIAGNOSIS — R07.9 CHEST PAIN: ICD-10-CM

## 2021-12-05 LAB
ALBUMIN SERPL BCP-MCNC: 3.9 G/DL (ref 3.5–5.2)
ALP SERPL-CCNC: 74 U/L (ref 55–135)
ALT SERPL W/O P-5'-P-CCNC: 30 U/L (ref 10–44)
ANION GAP SERPL CALC-SCNC: 11 MMOL/L (ref 8–16)
AST SERPL-CCNC: 20 U/L (ref 10–40)
BASOPHILS # BLD AUTO: 0.02 K/UL (ref 0–0.2)
BASOPHILS NFR BLD: 0.2 % (ref 0–1.9)
BILIRUB SERPL-MCNC: 0.4 MG/DL (ref 0.1–1)
BUN SERPL-MCNC: 11 MG/DL (ref 6–20)
CALCIUM SERPL-MCNC: 9.2 MG/DL (ref 8.7–10.5)
CHLORIDE SERPL-SCNC: 106 MMOL/L (ref 95–110)
CO2 SERPL-SCNC: 23 MMOL/L (ref 23–29)
CREAT SERPL-MCNC: 1.3 MG/DL (ref 0.5–1.4)
D DIMER PPP IA.FEU-MCNC: 0.23 MG/L FEU
DIFFERENTIAL METHOD: ABNORMAL
EOSINOPHIL # BLD AUTO: 0.1 K/UL (ref 0–0.5)
EOSINOPHIL NFR BLD: 1.2 % (ref 0–8)
ERYTHROCYTE [DISTWIDTH] IN BLOOD BY AUTOMATED COUNT: 14.6 % (ref 11.5–14.5)
EST. GFR  (AFRICAN AMERICAN): >60 ML/MIN/1.73 M^2
EST. GFR  (NON AFRICAN AMERICAN): >60 ML/MIN/1.73 M^2
GLUCOSE SERPL-MCNC: 170 MG/DL (ref 70–110)
HCT VFR BLD AUTO: 41 % (ref 40–54)
HGB BLD-MCNC: 13.4 G/DL (ref 14–18)
IMM GRANULOCYTES # BLD AUTO: 0.02 K/UL (ref 0–0.04)
IMM GRANULOCYTES NFR BLD AUTO: 0.2 % (ref 0–0.5)
INR PPP: 1 (ref 0.8–1.2)
LYMPHOCYTES # BLD AUTO: 3.3 K/UL (ref 1–4.8)
LYMPHOCYTES NFR BLD: 38.1 % (ref 18–48)
MCH RBC QN AUTO: 27.7 PG (ref 27–31)
MCHC RBC AUTO-ENTMCNC: 32.7 G/DL (ref 32–36)
MCV RBC AUTO: 85 FL (ref 82–98)
MONOCYTES # BLD AUTO: 0.4 K/UL (ref 0.3–1)
MONOCYTES NFR BLD: 4.2 % (ref 4–15)
NEUTROPHILS # BLD AUTO: 4.8 K/UL (ref 1.8–7.7)
NEUTROPHILS NFR BLD: 56.1 % (ref 38–73)
NRBC BLD-RTO: 0 /100 WBC
PLATELET # BLD AUTO: 277 K/UL (ref 150–450)
PMV BLD AUTO: 10 FL (ref 9.2–12.9)
POTASSIUM SERPL-SCNC: 4.4 MMOL/L (ref 3.5–5.1)
PROT SERPL-MCNC: 7.5 G/DL (ref 6–8.4)
PROTHROMBIN TIME: 10.2 SEC (ref 9–12.5)
RBC # BLD AUTO: 4.83 M/UL (ref 4.6–6.2)
SODIUM SERPL-SCNC: 140 MMOL/L (ref 136–145)
TROPONIN I SERPL DL<=0.01 NG/ML-MCNC: 0.01 NG/ML (ref 0–0.03)
WBC # BLD AUTO: 8.6 K/UL (ref 3.9–12.7)

## 2021-12-05 PROCEDURE — 93010 EKG 12-LEAD: ICD-10-PCS | Mod: ,,, | Performed by: INTERNAL MEDICINE

## 2021-12-05 PROCEDURE — 93005 ELECTROCARDIOGRAM TRACING: CPT

## 2021-12-05 PROCEDURE — 99285 EMERGENCY DEPT VISIT HI MDM: CPT | Mod: 25

## 2021-12-05 PROCEDURE — 36000 PLACE NEEDLE IN VEIN: CPT

## 2021-12-05 PROCEDURE — 80053 COMPREHEN METABOLIC PANEL: CPT | Performed by: EMERGENCY MEDICINE

## 2021-12-05 PROCEDURE — 93010 ELECTROCARDIOGRAM REPORT: CPT | Mod: ,,, | Performed by: INTERNAL MEDICINE

## 2021-12-05 PROCEDURE — 85025 COMPLETE CBC W/AUTO DIFF WBC: CPT | Performed by: EMERGENCY MEDICINE

## 2021-12-05 PROCEDURE — 85379 FIBRIN DEGRADATION QUANT: CPT | Performed by: EMERGENCY MEDICINE

## 2021-12-05 PROCEDURE — 85610 PROTHROMBIN TIME: CPT | Performed by: EMERGENCY MEDICINE

## 2021-12-05 PROCEDURE — 84484 ASSAY OF TROPONIN QUANT: CPT | Performed by: EMERGENCY MEDICINE

## 2021-12-06 VITALS
DIASTOLIC BLOOD PRESSURE: 70 MMHG | TEMPERATURE: 98 F | RESPIRATION RATE: 12 BRPM | WEIGHT: 315 LBS | SYSTOLIC BLOOD PRESSURE: 138 MMHG | OXYGEN SATURATION: 98 % | BODY MASS INDEX: 42.72 KG/M2 | HEART RATE: 65 BPM

## 2021-12-06 LAB — TROPONIN I SERPL DL<=0.01 NG/ML-MCNC: <0.006 NG/ML (ref 0–0.03)

## 2021-12-06 PROCEDURE — 84484 ASSAY OF TROPONIN QUANT: CPT | Performed by: EMERGENCY MEDICINE

## 2022-01-02 ENCOUNTER — HOSPITAL ENCOUNTER (EMERGENCY)
Facility: HOSPITAL | Age: 37
Discharge: HOME | End: 2022-01-02
Attending: EMERGENCY MEDICINE
Payer: MEDICAID

## 2022-01-02 VITALS
BODY MASS INDEX: 47.47 KG/M2 | SYSTOLIC BLOOD PRESSURE: 131 MMHG | DIASTOLIC BLOOD PRESSURE: 81 MMHG | WEIGHT: 315 LBS | RESPIRATION RATE: 18 BRPM | HEART RATE: 78 BPM | OXYGEN SATURATION: 99 % | TEMPERATURE: 98 F

## 2022-01-02 DIAGNOSIS — B34.9 ACUTE VIRAL SYNDROME: Primary | ICD-10-CM

## 2022-01-02 DIAGNOSIS — Z20.822 EXPOSURE TO COVID-19 VIRUS: ICD-10-CM

## 2022-01-02 DIAGNOSIS — Z20.822 ENCOUNTER FOR LABORATORY TESTING FOR COVID-19 VIRUS: ICD-10-CM

## 2022-01-02 PROCEDURE — 99284 EMERGENCY DEPT VISIT MOD MDM: CPT | Mod: 25

## 2022-01-02 PROCEDURE — U0003 INFECTIOUS AGENT DETECTION BY NUCLEIC ACID (DNA OR RNA); SEVERE ACUTE RESPIRATORY SYNDROME CORONAVIRUS 2 (SARS-COV-2) (CORONAVIRUS DISEASE [COVID-19]), AMPLIFIED PROBE TECHNIQUE, MAKING USE OF HIGH THROUGHPUT TECHNOLOGIES AS DESCRIBED BY CMS-2020-01-R: HCPCS | Performed by: NURSE PRACTITIONER

## 2022-01-02 PROCEDURE — U0005 INFEC AGEN DETEC AMPLI PROBE: HCPCS | Performed by: NURSE PRACTITIONER

## 2022-01-02 RX ORDER — BENZONATATE 100 MG/1
100 CAPSULE ORAL 3 TIMES DAILY PRN
Qty: 20 CAPSULE | Refills: 0 | Status: SHIPPED | OUTPATIENT
Start: 2022-01-02 | End: 2022-01-12

## 2022-01-02 NOTE — Clinical Note
"Beth "ELDER Damian was seen and treated in our emergency department on 1/2/2022.     COVID-19 is present in our communities across the state. There is limited testing for COVID at this time, so not all patients can be tested. In this situation, your employee meets the following criteria:    Beth Damian Jr. has met the criteria for COVID-19 testing based upon symptoms, travel, and/or potential exposure. The test has been completed and is pending results at this time. During this time the employee is not able to work and should be quarantined per the Centers for Disease Control timelines.     If you have any questions or concerns, or if I can be of further assistance, please do not hesitate to contact me.    Sincerely,             Chacorta Presley MD"

## 2022-01-03 NOTE — ED PROVIDER NOTES
Encounter Date: 1/2/2022       History     Chief Complaint   Patient presents with    COVID-19 Concerns     + nasal congestion and cough x 3-4 days + exposure      36-year-old male past medical history of diabetes, hypertension, and long-term anticoagulant use is here for nasal congestion cough for 3-4 days.  The patient reports known exposure to COVID-19.  He denies chest pain and shortness of breath.  He has been able tolerate oral fluids without difficulty.  His wife is here with the same complaints.  No other complaints at this time.    The history is provided by the patient.     Review of patient's allergies indicates:  No Known Allergies  Past Medical History:   Diagnosis Date    Diabetes mellitus     Hypercholesteremia     Hypertension     Sleep apnea      Past Surgical History:   Procedure Laterality Date    BILATERAL PULMONARY ANGIOGRAPHY  12/9/2020    Procedure: Angiogram Pulmonary Bilateral Selective;  Surgeon: Elan Silvestre MD;  Location: [facility] CATH LAB/EP;  Service: Cardiology;;    PERCUTANEOUS TRANSLUMINAL ANGIOPLASTY (PTA) OF PERIPHERAL VESSEL N/A 12/9/2020    Procedure: PTA, PERIPHERAL VESSEL;  Surgeon: Elan Silvestre MD;  Location: [facility] CATH LAB/EP;  Service: Cardiology;  Laterality: N/A;    PERCUTANEOUS TRANSLUMINAL ANGIOPLASTY (PTA) OF PERIPHERAL VESSEL N/A 12/10/2020    Procedure: PTA, PERIPHERAL VESSEL;  Surgeon: Tirso Parker MD;  Location: [facility] CATH LAB/EP;  Service: Cardiology;  Laterality: N/A;    PHLEBOGRAPHY  12/9/2020    Procedure: Venogram;  Surgeon: Elan Silvestre MD;  Location: [facility] CATH LAB/EP;  Service: Cardiology;;    THROMBECTOMY N/A 12/9/2020    Procedure: THROMBECTOMY;  Surgeon: Elan Silvestre MD;  Location: [facility] CATH LAB/EP;  Service: Cardiology;  Laterality: N/A;    THROMBECTOMY Bilateral 12/10/2020    Procedure: THROMBECTOMY;  Surgeon: Tirso Parker MD;  Location: [facility] CATH LAB/EP;  Service: Cardiology;  Laterality: Bilateral;     Family History    Problem Relation Age of Onset    Hypertension Mother     Deep vein thrombosis Mother     Hypertension Father     Diabetes Brother     Diabetes Daughter     Diabetes Maternal Grandmother     Diabetes Paternal Grandfather     Deep vein thrombosis Paternal Grandmother      Social History     Tobacco Use    Smoking status: Former Smoker     Packs/day: 0.50     Years: 9.00     Pack years: 4.50     Types: Cigars    Smokeless tobacco: Never Used    Tobacco comment: Patient states he has been cutting down on his own; currently smokes 1 cigar every other day or so. Denies need for NRT or need to go to ambulatory smoking cessation clinic.   Substance Use Topics    Alcohol use: Yes     Comment: occasional    Drug use: Yes     Types: Marijuana     Comment: last time 3 wks ago     Review of Systems   Constitutional: Negative for activity change, fatigue and fever.   HENT: Positive for congestion. Negative for rhinorrhea and sore throat.    Respiratory: Positive for cough. Negative for chest tightness and shortness of breath.    Cardiovascular: Negative for chest pain.   Gastrointestinal: Negative for abdominal pain, diarrhea, nausea and vomiting.   Musculoskeletal: Negative for back pain, joint swelling, myalgias and neck pain.   Skin: Negative.    Neurological: Negative for weakness and headaches.   Hematological: Bruises/bleeds easily.   Psychiatric/Behavioral: Negative for confusion.   All other systems reviewed and are negative.      Physical Exam     Initial Vitals [01/02/22 1839]   BP Pulse Resp Temp SpO2   131/81 78 18 97.7 °F (36.5 °C) 99 %      MAP       --         Physical Exam    Nursing note and vitals reviewed.  Constitutional: Vital signs are normal. He appears well-developed and well-nourished. He is Obese . He is active and cooperative. He is easily aroused.  Non-toxic appearance. He does not have a sickly appearance. He does not appear ill. No distress.   HENT:   Head: Normocephalic and  atraumatic.   Mouth/Throat: Mucous membranes are normal.   Eyes: Conjunctivae are normal.   Neck: Phonation normal.   Normal range of motion.  Cardiovascular: Normal rate, regular rhythm and normal heart sounds.   Pulmonary/Chest: Effort normal and breath sounds normal.   Abdominal: Abdomen is soft. Normal appearance and bowel sounds are normal. He exhibits no distension. There is no abdominal tenderness. There is no rigidity, no rebound, no guarding and no CVA tenderness.   Musculoskeletal:      Cervical back: Normal range of motion.     Neurological: He is alert, oriented to person, place, and time and easily aroused. He has normal strength. Coordination normal. GCS eye subscore is 4. GCS verbal subscore is 5. GCS motor subscore is 6.   Skin: Skin is warm, dry and intact. No bruising and no rash noted.   Psychiatric: He has a normal mood and affect. His speech is normal and behavior is normal. Judgment and thought content normal. Cognition and memory are normal.         ED Course   Procedures  Labs Reviewed   SARS-COV-2 (COVID-19) QUALITATIVE PCR          Imaging Results    None          Medications - No data to display  Medical Decision Making:   Initial Assessment:   37 yo male here for cough and congestion for 3-4 days with known exposure to COVID.  Denies CP, SOB, fever, and vomiting. Pt is well-appearing. Vitals stable.   Differential Diagnosis:   COVID, Viral, URI, allergies, irritants, influenza  Clinical Tests:   Lab Tests: Ordered  ED Management:  Pt underwent PCR COVID testing.  I do suspect COVID given the known exposure as well as symptoms.  He was given an ambulatory referral for the monoclonal antibody infusion due to COVID risk score of 5. He was also in rolled home symptom monitoring program I reviewed quarantine guidelines and symptomatic care.  He is stable for discharge.  He is not hypoxic.  He is tolerating oral fluids.    Pt to follow up with PCP within 7 days.  I reviewed strict return  precautions including worsening or change of today's complaints, worsening cough, chest pain, and shortness of breath.. In addition, pt is to return to the ED if condition changes, progresses, or if there are any concerns.  Pt verbalized understanding, compliance, and agreement with the treatment plan.        5                        Clinical Impression:   Final diagnoses:  [B34.9] Acute viral syndrome (Primary)  [Z20.822] Exposure to COVID-19 virus  [Z20.822] Encounter for laboratory testing for COVID-19 virus          ED Disposition Condition    Discharge Stable        ED Prescriptions     Medication Sig Dispense Start Date End Date Auth. Provider    benzonatate (TESSALON) 100 MG capsule Take 1 capsule (100 mg total) by mouth 3 (three) times daily as needed for Cough. 20 capsule 1/2/2022 1/12/2022 THI Gao        Follow-up Information     Follow up With Specialties Details Why Contact Info    Your doctor  Schedule an appointment as soon as possible for a visit in 1 week             THI Gao  01/02/22 6240

## 2022-01-03 NOTE — ED NOTES
Pt presents to ED c/o Covid-like symptoms     APPEARANCE: Alert, oriented and in no acute distress.  HEENT: Speaks without hoarseness.  CARDIAC: Normal rate and rhythm.    PERIPHERAL VASCULAR: peripheral pulses present. Normal cap refill. No edema. Warm to touch.    RESPIRATORY:Normal rate and effort. Respirations are equal and unlabored no obvious signs of distress.  GASTRO: soft, nondistended, nontender. Denies nausea, vomiting, or diarrhea.  : voids spontaneously and without difficulty.   MUSC: Full ROM. No obvious deformity. Ambulatory with a steady gait  SKIN: Skin is warm and dry, without discoloration. Mucous membranes moist.  NEURO: Pt is awake, alert, aware of environment. No neurologic deficits noted.

## 2022-01-03 NOTE — DISCHARGE INSTRUCTIONS
Increase your fluid intake.  You may take tylenol as directed on the labeling.     COVID test is pending.      Return to the ED if your condition changes, progresses, or if you have any concerns.      Thank you for choosing [facility]! We appreciate you coming to us for your medical care. We hope you feel better soon! Please come back to [facility] for all of your future medical needs.      Our goal in the emergency department is to always give you outstanding care and exceptional service. You may receive a survey by mail or e-mail in the next week regarding your experience in our ED. We would greatly appreciate your completing and returning the survey. Your feedback provides us with a way to recognize our staff who give very good care and it helps us learn how to improve when your experience was below our aspiration of excellence.      Sincerely,  BISMARK Salazar  Lead [facility]

## 2022-01-04 LAB
SARS-COV-2 RNA RESP QL NAA+PROBE: DETECTED
SARS-COV-2- CYCLE NUMBER: 22

## 2022-02-13 ENCOUNTER — HOSPITAL ENCOUNTER (EMERGENCY)
Facility: HOSPITAL | Age: 37
Discharge: HOME | End: 2022-02-13
Attending: EMERGENCY MEDICINE
Payer: MEDICAID

## 2022-02-13 VITALS
SYSTOLIC BLOOD PRESSURE: 128 MMHG | HEART RATE: 70 BPM | WEIGHT: 315 LBS | TEMPERATURE: 98 F | BODY MASS INDEX: 43.4 KG/M2 | DIASTOLIC BLOOD PRESSURE: 76 MMHG | RESPIRATION RATE: 20 BRPM | OXYGEN SATURATION: 98 %

## 2022-02-13 DIAGNOSIS — R07.9 CHEST PAIN: ICD-10-CM

## 2022-02-13 DIAGNOSIS — E86.0 DEHYDRATION: ICD-10-CM

## 2022-02-13 DIAGNOSIS — M94.0 COSTOCHONDRITIS: Primary | ICD-10-CM

## 2022-02-13 LAB
ALBUMIN SERPL BCP-MCNC: 4 G/DL (ref 3.5–5.2)
ALP SERPL-CCNC: 72 U/L (ref 55–135)
ALT SERPL W/O P-5'-P-CCNC: 31 U/L (ref 10–44)
ANION GAP SERPL CALC-SCNC: 11 MMOL/L (ref 8–16)
AST SERPL-CCNC: 28 U/L (ref 10–40)
BASOPHILS # BLD AUTO: 0.03 K/UL (ref 0–0.2)
BASOPHILS NFR BLD: 0.3 % (ref 0–1.9)
BILIRUB SERPL-MCNC: 0.5 MG/DL (ref 0.1–1)
BILIRUB UR QL STRIP: NEGATIVE
BNP SERPL-MCNC: <10 PG/ML (ref 0–99)
BUN SERPL-MCNC: 15 MG/DL (ref 6–20)
CALCIUM SERPL-MCNC: 8.6 MG/DL (ref 8.7–10.5)
CHLORIDE SERPL-SCNC: 103 MMOL/L (ref 95–110)
CLARITY UR: CLEAR
CO2 SERPL-SCNC: 25 MMOL/L (ref 23–29)
COLOR UR: YELLOW
CREAT SERPL-MCNC: 1.7 MG/DL (ref 0.5–1.4)
D DIMER PPP IA.FEU-MCNC: 0.35 MG/L FEU
DIFFERENTIAL METHOD: ABNORMAL
EOSINOPHIL # BLD AUTO: 0.1 K/UL (ref 0–0.5)
EOSINOPHIL NFR BLD: 0.9 % (ref 0–8)
ERYTHROCYTE [DISTWIDTH] IN BLOOD BY AUTOMATED COUNT: 14.7 % (ref 11.5–14.5)
EST. GFR  (AFRICAN AMERICAN): 57 ML/MIN/1.73 M^2
EST. GFR  (NON AFRICAN AMERICAN): 50 ML/MIN/1.73 M^2
GLUCOSE SERPL-MCNC: 191 MG/DL (ref 70–110)
GLUCOSE UR QL STRIP: NEGATIVE
HCT VFR BLD AUTO: 37.6 % (ref 40–54)
HGB BLD-MCNC: 12.6 G/DL (ref 14–18)
HGB UR QL STRIP: NEGATIVE
IMM GRANULOCYTES # BLD AUTO: 0.01 K/UL (ref 0–0.04)
IMM GRANULOCYTES NFR BLD AUTO: 0.1 % (ref 0–0.5)
KETONES UR QL STRIP: NEGATIVE
LEUKOCYTE ESTERASE UR QL STRIP: NEGATIVE
LIPASE SERPL-CCNC: 25 U/L (ref 4–60)
LYMPHOCYTES # BLD AUTO: 4 K/UL (ref 1–4.8)
LYMPHOCYTES NFR BLD: 39.8 % (ref 18–48)
MCH RBC QN AUTO: 27.8 PG (ref 27–31)
MCHC RBC AUTO-ENTMCNC: 33.5 G/DL (ref 32–36)
MCV RBC AUTO: 83 FL (ref 82–98)
MONOCYTES # BLD AUTO: 0.5 K/UL (ref 0.3–1)
MONOCYTES NFR BLD: 4.9 % (ref 4–15)
NEUTROPHILS # BLD AUTO: 5.4 K/UL (ref 1.8–7.7)
NEUTROPHILS NFR BLD: 54 % (ref 38–73)
NITRITE UR QL STRIP: NEGATIVE
NRBC BLD-RTO: 0 /100 WBC
PH UR STRIP: 6 [PH] (ref 5–8)
PLATELET # BLD AUTO: 275 K/UL (ref 150–450)
PMV BLD AUTO: 9.5 FL (ref 9.2–12.9)
POTASSIUM SERPL-SCNC: 3.3 MMOL/L (ref 3.5–5.1)
PROT SERPL-MCNC: 7.6 G/DL (ref 6–8.4)
PROT UR QL STRIP: NEGATIVE
RBC # BLD AUTO: 4.53 M/UL (ref 4.6–6.2)
SODIUM SERPL-SCNC: 139 MMOL/L (ref 136–145)
SP GR UR STRIP: 1.02 (ref 1–1.03)
TROPONIN I SERPL DL<=0.01 NG/ML-MCNC: <0.006 NG/ML (ref 0–0.03)
URN SPEC COLLECT METH UR: ABNORMAL
UROBILINOGEN UR STRIP-ACNC: ABNORMAL EU/DL
WBC # BLD AUTO: 10.03 K/UL (ref 3.9–12.7)

## 2022-02-13 PROCEDURE — 85379 FIBRIN DEGRADATION QUANT: CPT | Performed by: EMERGENCY MEDICINE

## 2022-02-13 PROCEDURE — 83880 ASSAY OF NATRIURETIC PEPTIDE: CPT | Performed by: EMERGENCY MEDICINE

## 2022-02-13 PROCEDURE — 81003 URINALYSIS AUTO W/O SCOPE: CPT | Performed by: EMERGENCY MEDICINE

## 2022-02-13 PROCEDURE — 80053 COMPREHEN METABOLIC PANEL: CPT | Performed by: EMERGENCY MEDICINE

## 2022-02-13 PROCEDURE — 93005 ELECTROCARDIOGRAM TRACING: CPT

## 2022-02-13 PROCEDURE — 25000003 PHARM REV CODE 250: Performed by: EMERGENCY MEDICINE

## 2022-02-13 PROCEDURE — 93010 EKG 12-LEAD: ICD-10-PCS | Mod: ,,, | Performed by: INTERNAL MEDICINE

## 2022-02-13 PROCEDURE — 93010 ELECTROCARDIOGRAM REPORT: CPT | Mod: ,,, | Performed by: INTERNAL MEDICINE

## 2022-02-13 PROCEDURE — 96360 HYDRATION IV INFUSION INIT: CPT

## 2022-02-13 PROCEDURE — 85025 COMPLETE CBC W/AUTO DIFF WBC: CPT | Performed by: EMERGENCY MEDICINE

## 2022-02-13 PROCEDURE — 99285 EMERGENCY DEPT VISIT HI MDM: CPT | Mod: 25

## 2022-02-13 PROCEDURE — 84484 ASSAY OF TROPONIN QUANT: CPT | Performed by: EMERGENCY MEDICINE

## 2022-02-13 PROCEDURE — 83690 ASSAY OF LIPASE: CPT | Performed by: EMERGENCY MEDICINE

## 2022-02-13 RX ORDER — LIDOCAINE 50 MG/G
1 PATCH TOPICAL
Status: DISCONTINUED | OUTPATIENT
Start: 2022-02-13 | End: 2022-02-13 | Stop reason: HOSPADM

## 2022-02-13 RX ORDER — LIDOCAINE 50 MG/G
1 PATCH TOPICAL DAILY
Qty: 30 PATCH | Refills: 0 | Status: SHIPPED | OUTPATIENT
Start: 2022-02-13

## 2022-02-13 RX ADMIN — LIDOCAINE 1 PATCH: 50 PATCH CUTANEOUS at 02:02

## 2022-02-13 RX ADMIN — SODIUM CHLORIDE 1000 ML: 0.9 INJECTION, SOLUTION INTRAVENOUS at 02:02

## 2022-02-13 NOTE — ED PROVIDER NOTES
"Encounter Date: 2/13/2022       History     Chief Complaint   Patient presents with    Chest Pain     C/I midsternal chest pain does not radiate that began approx 1hr pta with shortness of breath. Pt reports having "mild" chest pain yesterday that resolved. Pt has hx of DVTs; currently taking blood thinners.      This is a pleasant 37-year-old male with multiple medical problems including recent COVID infection last week, hypertension, DVT/PE last year on Eliquis, and diabetes who presents the ER for evaluation of chest pain.  Onset yesterday intermittent midsternal reproducible.  With some mild shortness of breath.  Patient reports symptoms started yesterday, subsided and then around 10/11 presented again.  Concern to come to the ER.  Resting comfortably in bed no acute distress endorses compliance with medications no nausea vomiting fever chills never had symptoms like this before.        Review of patient's allergies indicates:  No Known Allergies  Past Medical History:   Diagnosis Date    Diabetes mellitus     Hypercholesteremia     Hypertension     Sleep apnea      Past Surgical History:   Procedure Laterality Date    BILATERAL PULMONARY ANGIOGRAPHY  12/9/2020    Procedure: Angiogram Pulmonary Bilateral Selective;  Surgeon: Elan Silvestre MD;  Location: [facility] CATH LAB/EP;  Service: Cardiology;;    PERCUTANEOUS TRANSLUMINAL ANGIOPLASTY (PTA) OF PERIPHERAL VESSEL N/A 12/9/2020    Procedure: PTA, PERIPHERAL VESSEL;  Surgeon: Elan Silvestre MD;  Location: [facility] CATH LAB/EP;  Service: Cardiology;  Laterality: N/A;    PERCUTANEOUS TRANSLUMINAL ANGIOPLASTY (PTA) OF PERIPHERAL VESSEL N/A 12/10/2020    Procedure: PTA, PERIPHERAL VESSEL;  Surgeon: Tirso Parker MD;  Location: [facility] CATH LAB/EP;  Service: Cardiology;  Laterality: N/A;    PHLEBOGRAPHY  12/9/2020    Procedure: Venogram;  Surgeon: Elan Silvestre MD;  Location: [facility] CATH LAB/EP;  Service: Cardiology;;    THROMBECTOMY N/A 12/9/2020    " Procedure: THROMBECTOMY;  Surgeon: Elan Silvestre MD;  Location: [facility] CATH LAB/EP;  Service: Cardiology;  Laterality: N/A;    THROMBECTOMY Bilateral 12/10/2020    Procedure: THROMBECTOMY;  Surgeon: Tirso Parker MD;  Location: [facility] CATH LAB/EP;  Service: Cardiology;  Laterality: Bilateral;     Family History   Problem Relation Age of Onset    Hypertension Mother     Deep vein thrombosis Mother     Hypertension Father     Diabetes Brother     Diabetes Daughter     Diabetes Maternal Grandmother     Diabetes Paternal Grandfather     Deep vein thrombosis Paternal Grandmother      Social History     Tobacco Use    Smoking status: Former Smoker     Packs/day: 0.50     Years: 9.00     Pack years: 4.50     Types: Cigars    Smokeless tobacco: Never Used    Tobacco comment: Patient states he has been cutting down on his own; currently smokes 1 cigar every other day or so. Denies need for NRT or need to go to ambulatory smoking cessation clinic.   Substance Use Topics    Alcohol use: Yes     Comment: occasional    Drug use: Yes     Types: Marijuana     Comment: last time 3 wks ago     Review of Systems   Constitutional: Positive for fatigue.   Respiratory: Positive for shortness of breath.    Cardiovascular: Positive for chest pain.   Gastrointestinal: Negative for abdominal pain, diarrhea and vomiting.   All other systems reviewed and are negative.      Physical Exam     Initial Vitals [02/13/22 0112]   BP Pulse Resp Temp SpO2   (!) 151/94 95 19 98.2 °F (36.8 °C) 95 %      MAP       --         Physical Exam    Nursing note and vitals reviewed.  Constitutional: He appears well-developed and well-nourished.   HENT:   Head: Normocephalic and atraumatic.   Eyes: Pupils are equal, round, and reactive to light.   Neck:   Normal range of motion.  Cardiovascular: Normal rate, regular rhythm and normal heart sounds.   Pulmonary/Chest: No respiratory distress.   Reproducible midsternal chest wall tenderness    Abdominal: Abdomen is soft. He exhibits no distension. There is no abdominal tenderness.   Musculoskeletal:         General: Normal range of motion.      Cervical back: Normal range of motion.     Neurological: He is alert and oriented to person, place, and time. He has normal strength. GCS score is 15. GCS eye subscore is 4. GCS verbal subscore is 5. GCS motor subscore is 6.   Skin: Skin is warm and dry. Capillary refill takes less than 2 seconds.   Psychiatric: He has a normal mood and affect. Thought content normal.         ED Course   Procedures  Labs Reviewed   CBC W/ AUTO DIFFERENTIAL - Abnormal; Notable for the following components:       Result Value    RBC 4.53 (*)     Hemoglobin 12.6 (*)     Hematocrit 37.6 (*)     RDW 14.7 (*)     All other components within normal limits   COMPREHENSIVE METABOLIC PANEL - Abnormal; Notable for the following components:    Potassium 3.3 (*)     Glucose 191 (*)     Creatinine 1.7 (*)     Calcium 8.6 (*)     eGFR if  57 (*)     eGFR if non  50 (*)     All other components within normal limits   URINALYSIS, REFLEX TO URINE CULTURE - Abnormal; Notable for the following components:    Urobilinogen, UA 2.0-3.0 (*)     All other components within normal limits    Narrative:     Specimen Source->Urine   B-TYPE NATRIURETIC PEPTIDE   D DIMER, QUANTITATIVE   TROPONIN I   LIPASE   LIPASE          Imaging Results          X-Ray Chest PA And Lateral (Final result)  Result time 02/13/22 01:44:45    Final result by Alberto Hopper MD (02/13/22 01:44:45)                 Impression:      No radiographic evidence of acute intrathoracic process.      Electronically signed by: Alberto Hopper MD  Date:    02/13/2022  Time:    01:44             Narrative:    EXAMINATION:  XR CHEST PA AND LATERAL    CLINICAL HISTORY:  Chest pain, unspecified    TECHNIQUE:  PA and lateral views of the chest were performed.    COMPARISON:  12/05/2021    FINDINGS:  Cardiac  monitoring leads overlie the chest.  The cardiomediastinal silhouette appears within normal limits.  The lungs are symmetrically aerated without evidence of focal airspace consolidation.  There is no pleural effusion or pneumothorax.  Visualized osseous structures appear intact.                                 Medications   LIDOcaine 5 % patch 1 patch (1 patch Transdermal Patch Applied 2/13/22 0234)   sodium chloride 0.9% bolus 1,000 mL (0 mLs Intravenous Stopped 2/13/22 0311)     Medical Decision Making:   Initial Assessment:   37-year-old male presents the ER for evaluation of midsternal chest pain.  History of PE on Eliquis, endorses compliance.  Does have reproducible midsternal chest wall tenderness consistent with costochondritis.  Does endorse recent COVID infection.  Will plan blood work symptomatic support x-ray reassess.  Differential Diagnosis:   PE, costochondritis, NSTEMI, ACS, other cause             ED Course as of 02/13/22 0442   Sun Feb 13, 2022   0154 UA revealed slight Scotty I will give fluids. [SE]   0310 Resting in bed no acute distress no abnormalities noted on cardiac labs.  Awaiting UA and hopefully discharge. [SE]   0404 Resting comfortably in bed, no acute distress urine reviewed no acute process identified rest of blood work reviewed no acute process identified.  Discussed with patient.  Believe may be suffering from costochondritis after recent COVID infection.  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  Strict return precautions were discussed with patient understood agreed with.  Will give Lidoderm patch, advised Tylenol, discussed risks of NSAIDs on Eliquis.  Patient understood agreed with plan will follow up with PCP patient will be discharged. [SE]      ED Course User Index  [SE] Aliya Rainey MD             Clinical Impression:   Final diagnoses:  [R07.9] Chest pain  [M94.0] Costochondritis  (Primary)  [E86.0] Dehydration          ED Disposition Condition    Discharge Stable        ED Prescriptions     Medication Sig Dispense Start Date End Date Auth. Provider    LIDOcaine (LIDODERM) 5 % Place 1 patch onto the skin once daily. Remove & Discard patch within 12 hours or as directed by MD 30 patch 2/13/2022  Aliya Rainey MD        Follow-up Information     Follow up With Specialties Details Why Contact Info Additional Information    [facility] Family Medicine Schedule an appointment as soon as possible for a visit  As needed [address], [address]  [facility] [address] [phone] Please park in [address] or [address] and use [address]. Take Medical Office Bldg. elevators.           Aliya Rainey MD  02/13/22 7274

## 2022-02-13 NOTE — DISCHARGE INSTRUCTIONS
Follow-up with primary care please take medication as discussed return to the ER for any concerning reason.

## 2022-02-14 ENCOUNTER — PES CALL (OUTPATIENT)
Dept: ADMINISTRATIVE | Facility: CLINIC | Age: 37
End: 2022-02-14
Payer: MEDICAID

## (undated) DEVICE — CATH VISIONS PV IVUS .035

## (undated) DEVICE — DILATOR 12FR

## (undated) DEVICE — Device

## (undated) DEVICE — GUIDEWIRE EMERALD 150CM PTFE

## (undated) DEVICE — GUIDEWIRE STF .035X260CM ANG

## (undated) DEVICE — CATH IMPULSE 5FR PIGTAIL 125CM

## (undated) DEVICE — GUIDEWIRE STD .035X260CM ANG

## (undated) DEVICE — CATH TRIEVER24 OTW 24FR 95CM

## (undated) DEVICE — DILATOR VESSEL 16FR 20CM

## (undated) DEVICE — CATH ULTRAVERSE 035 10X60X75

## (undated) DEVICE — FLEXSHEATH DRYSEAL 24FR 33CM

## (undated) DEVICE — SET MICRO PUNCT 4FR/MPIS-401

## (undated) DEVICE — SEE MEDLINE ITEM 157187

## (undated) DEVICE — GUIDEWIRE EMERALD .035IN 260CM

## (undated) DEVICE — CONTRAST VISIPAQUE 150ML

## (undated) DEVICE — GUIDEWIRE AMPLATZ .035X260 SS

## (undated) DEVICE — SHEATH INTRODUCER 8FR 11CM

## (undated) DEVICE — DEVICE INDEFLATOR BASIX

## (undated) DEVICE — SHEATH CLOTTRIEVER 13FR

## (undated) DEVICE — SHEATH INTRODUCER 6FR 11CM

## (undated) DEVICE — CATH 5FR MP 125CM 5/BX

## (undated) DEVICE — BLLN ATLAS GOLD 16 X 60

## (undated) DEVICE — SHEATH 10FR 11CM BRITE TIP

## (undated) DEVICE — CATH CLOTTRIEVER 13FR 16MM

## (undated) DEVICE — KIT LEFT HEART MANIFOLD CUSTOM

## (undated) DEVICE — DEVICE PERCLOSE SUT CLSR 6FR

## (undated) DEVICE — VISIPAQUE 320 200ML +PK

## (undated) DEVICE — COVER PROBE US 5.5X58L NON LTX

## (undated) DEVICE — PAD DEFIB CADENCE ADULT R2

## (undated) DEVICE — SHEATH CHECK FLO 12FR/30CM